# Patient Record
Sex: MALE | Race: WHITE | NOT HISPANIC OR LATINO | Employment: OTHER | URBAN - METROPOLITAN AREA
[De-identification: names, ages, dates, MRNs, and addresses within clinical notes are randomized per-mention and may not be internally consistent; named-entity substitution may affect disease eponyms.]

---

## 2018-01-19 ENCOUNTER — OFFICE VISIT (OUTPATIENT)
Dept: URGENT CARE | Facility: CLINIC | Age: 68
End: 2018-01-19
Payer: MEDICARE

## 2018-01-19 VITALS
SYSTOLIC BLOOD PRESSURE: 136 MMHG | RESPIRATION RATE: 14 BRPM | HEIGHT: 69 IN | OXYGEN SATURATION: 96 % | WEIGHT: 190 LBS | TEMPERATURE: 98.3 F | BODY MASS INDEX: 28.14 KG/M2 | HEART RATE: 81 BPM | DIASTOLIC BLOOD PRESSURE: 88 MMHG

## 2018-01-19 DIAGNOSIS — J32.9 RHINOSINUSITIS: ICD-10-CM

## 2018-01-19 DIAGNOSIS — J98.8 RTI (RESPIRATORY TRACT INFECTION): ICD-10-CM

## 2018-01-19 PROCEDURE — 99203 OFFICE O/P NEW LOW 30 MIN: CPT | Performed by: FAMILY MEDICINE

## 2018-01-19 RX ORDER — AMOXICILLIN AND CLAVULANATE POTASSIUM 875; 125 MG/1; MG/1
1 TABLET, FILM COATED ORAL 2 TIMES DAILY
Qty: 14 TAB | Refills: 0 | Status: SHIPPED | OUTPATIENT
Start: 2018-01-19 | End: 2018-01-26

## 2018-01-19 RX ORDER — PREDNISONE 10 MG/1
30 TABLET ORAL EVERY MORNING
Qty: 21 TAB | Refills: 0 | Status: SHIPPED | OUTPATIENT
Start: 2018-01-19 | End: 2018-01-26

## 2018-01-19 ASSESSMENT — PATIENT HEALTH QUESTIONNAIRE - PHQ9: CLINICAL INTERPRETATION OF PHQ2 SCORE: 0

## 2018-01-19 ASSESSMENT — ENCOUNTER SYMPTOMS
ORTHOPNEA: 0
FOCAL WEAKNESS: 0
CHILLS: 0
FEVER: 0
SINUS PAIN: 1
SHORTNESS OF BREATH: 0
HEMOPTYSIS: 0
DIZZINESS: 0

## 2018-01-19 NOTE — PROGRESS NOTES
"Subjective:      Marquez Crawford is a 67 y.o. male who presents with Sinus Problem (possible sinus infection)    Chief Complaint   Patient presents with   • Sinus Problem     possible sinus infection        - This is a very pleasant 67 y.o. male with complaints of sinus pain pressure DC x 3wks, no NVFC          ALLERGIES:  Patient has no known allergies.     PMH:  History reviewed. No pertinent past medical history.     MEDS:  No current outpatient prescriptions on file.    ** I have documented what I find to be significant in regards to past medical, social, family and surgical history  in my HPI or under PMH/PSH/FH review section, otherwise it is contributory **           HPI    Review of Systems   Constitutional: Negative for chills and fever.   HENT: Positive for congestion and sinus pain.    Respiratory: Negative for hemoptysis and shortness of breath.    Cardiovascular: Negative for chest pain and orthopnea.   Neurological: Negative for dizziness and focal weakness.          Objective:     /88   Pulse 81   Temp 36.8 °C (98.3 °F)   Resp 14   Ht 1.753 m (5' 9\")   Wt 86.2 kg (190 lb)   SpO2 96%   BMI 28.06 kg/m²      Physical Exam   Constitutional: He appears well-developed. No distress.   HENT:   Head: Normocephalic and atraumatic.   Mouth/Throat: Oropharynx is clear and moist.   Eyes: Conjunctivae are normal.   Neck: Neck supple.   Cardiovascular: Regular rhythm.    No murmur heard.  Pulmonary/Chest: Effort normal and breath sounds normal. No respiratory distress.   Neurological: He is alert. He exhibits normal muscle tone.   Skin: Skin is warm and dry.   Psychiatric: He has a normal mood and affect. Judgment normal.   Nursing note and vitals reviewed.  boggy nasal mucosa, perf septum.             Assessment/Plan:         1. RTI (respiratory tract infection)     2. Rhinosinusitis               Dx & d/c instructions discussed w/ patient and/or family members. Follow up w/ Prvt Dr or here in 3-4 " days if not getting better, sooner if needed,  ER if worse and UC/PCP unavailable.        Possible side effects (i.e. Rash, GI upset/constipation, sedation, elevation of BP or sugars) of any medications given discussed.

## 2018-03-05 ENCOUNTER — OFFICE VISIT (OUTPATIENT)
Dept: URGENT CARE | Facility: CLINIC | Age: 68
End: 2018-03-05
Payer: MEDICARE

## 2018-03-05 VITALS
DIASTOLIC BLOOD PRESSURE: 80 MMHG | BODY MASS INDEX: 27.85 KG/M2 | HEIGHT: 69 IN | TEMPERATURE: 98 F | SYSTOLIC BLOOD PRESSURE: 120 MMHG | HEART RATE: 68 BPM | WEIGHT: 188 LBS | RESPIRATION RATE: 18 BRPM | OXYGEN SATURATION: 98 %

## 2018-03-05 DIAGNOSIS — J01.41 ACUTE RECURRENT PANSINUSITIS: ICD-10-CM

## 2018-03-05 DIAGNOSIS — J22 LRTI (LOWER RESPIRATORY TRACT INFECTION): ICD-10-CM

## 2018-03-05 PROCEDURE — 99214 OFFICE O/P EST MOD 30 MIN: CPT | Performed by: PHYSICIAN ASSISTANT

## 2018-03-05 RX ORDER — DOXYCYCLINE HYCLATE 100 MG
100 TABLET ORAL 2 TIMES DAILY
Qty: 20 TAB | Refills: 0 | Status: SHIPPED | OUTPATIENT
Start: 2018-03-05 | End: 2018-03-15

## 2018-03-05 RX ORDER — METHYLPREDNISOLONE 4 MG/1
TABLET ORAL
Qty: 21 TAB | Refills: 0 | Status: SHIPPED | OUTPATIENT
Start: 2018-03-05 | End: 2018-03-20

## 2018-03-05 ASSESSMENT — ENCOUNTER SYMPTOMS
SPUTUM PRODUCTION: 0
CHILLS: 0
FOCAL WEAKNESS: 0
TINGLING: 0
SHORTNESS OF BREATH: 0
HEADACHES: 1
COUGH: 1
SINUS PAIN: 1
MYALGIAS: 0
ABDOMINAL PAIN: 0
DIARRHEA: 0
PALPITATIONS: 0
FEVER: 0
HOARSE VOICE: 0
SWOLLEN GLANDS: 0
VOMITING: 0
SORE THROAT: 0
SENSORY CHANGE: 0
WHEEZING: 0
NAUSEA: 0
SINUS PRESSURE: 1

## 2018-03-06 NOTE — PROGRESS NOTES
"Subjective:      Marquez Crawford is a 67 y.o. male who presents with Sinus Problem            Sinus Problem   This is a new problem. Episode onset: 2 weeks  The problem is unchanged. There has been no fever. Associated symptoms include congestion, coughing, ear pain (ear pressure ), headaches and sinus pressure. Pertinent negatives include no chills, hoarse voice, shortness of breath, sneezing, sore throat or swollen glands. Treatments tried: Mucinex  The treatment provided no relief.     No past medical history on file.    No past surgical history on file.    No family history on file.    No Known Allergies    Medications, Allergies, and current problem list reviewed today in Epic      Review of Systems   Constitutional: Negative for chills, fever and malaise/fatigue.   HENT: Positive for congestion, ear pain (ear pressure ), sinus pain and sinus pressure. Negative for ear discharge, hoarse voice, sneezing and sore throat.    Respiratory: Positive for cough. Negative for sputum production, shortness of breath and wheezing.    Cardiovascular: Negative for chest pain, palpitations and leg swelling.   Gastrointestinal: Negative for abdominal pain, diarrhea, nausea and vomiting.   Musculoskeletal: Negative for myalgias.   Skin: Negative for rash.   Neurological: Positive for headaches. Negative for tingling, sensory change and focal weakness.     All other systems reviewed and are negative.        Objective:     /80   Pulse 68   Temp 36.7 °C (98 °F)   Resp 18   Ht 1.753 m (5' 9\")   Wt 85.3 kg (188 lb)   SpO2 98%   BMI 27.76 kg/m²      Physical Exam   Constitutional: He is oriented to person, place, and time. He appears well-developed and well-nourished. No distress.   HENT:   Head: Normocephalic and atraumatic.   Right Ear: Tympanic membrane, external ear and ear canal normal.   Left Ear: Tympanic membrane, external ear and ear canal normal.   Nose: Mucosal edema and rhinorrhea present. Right sinus " exhibits maxillary sinus tenderness. Left sinus exhibits maxillary sinus tenderness.   Mouth/Throat: Uvula is midline, oropharynx is clear and moist and mucous membranes are normal.   Eyes: Conjunctivae are normal.   Neck: Neck supple.   Cardiovascular: Normal rate, regular rhythm and normal heart sounds.  Exam reveals no gallop and no friction rub.    No murmur heard.  Pulmonary/Chest: Effort normal. No respiratory distress. He has no decreased breath sounds. He has wheezes (mild, diffuse wheezes). He has no rhonchi. He has no rales.   Lymphadenopathy:     He has no cervical adenopathy.   Neurological: He is alert and oriented to person, place, and time. No cranial nerve deficit.   Skin: Skin is warm and dry. No rash noted.   Psychiatric: He has a normal mood and affect. His behavior is normal. Judgment and thought content normal.               Assessment/Plan:     1. Acute recurrent pansinusitis    2. LRTI (lower respiratory tract infection)    - doxycycline (VIBRAMYCIN) 100 MG Tab; Take 1 Tab by mouth 2 times a day for 10 days.  Dispense: 20 Tab; Refill: 0  - MethylPREDNISolone (MEDROL DOSEPAK) 4 MG Tablet Therapy Pack; Take as directed on package. 1 pack.  Dispense: 21 Tab; Refill: 0    - encouraged Flonase, nasal saline rinses.     Differential diagnoses, Supportive care, and indications for immediate follow-up discussed with patient.   Instructed to return to clinic or nearest emergency department for any change in condition, further concerns, or worsening of symptoms.    The patient demonstrated a good understanding and agreed with the treatment plan.    Altagracia Monroy P.A.-C.

## 2018-03-20 ENCOUNTER — OFFICE VISIT (OUTPATIENT)
Dept: MEDICAL GROUP | Facility: MEDICAL CENTER | Age: 68
End: 2018-03-20
Payer: MEDICARE

## 2018-03-20 VITALS
TEMPERATURE: 98.2 F | BODY MASS INDEX: 27.98 KG/M2 | WEIGHT: 188.93 LBS | HEIGHT: 69 IN | DIASTOLIC BLOOD PRESSURE: 68 MMHG | RESPIRATION RATE: 16 BRPM | OXYGEN SATURATION: 96 % | HEART RATE: 61 BPM | SYSTOLIC BLOOD PRESSURE: 138 MMHG

## 2018-03-20 DIAGNOSIS — K21.9 GASTROESOPHAGEAL REFLUX DISEASE, ESOPHAGITIS PRESENCE NOT SPECIFIED: ICD-10-CM

## 2018-03-20 DIAGNOSIS — E78.5 HYPERLIPIDEMIA LDL GOAL <100: ICD-10-CM

## 2018-03-20 DIAGNOSIS — J32.9 RECURRENT SINUSITIS: ICD-10-CM

## 2018-03-20 DIAGNOSIS — Z00.00 PREVENTATIVE HEALTH CARE: ICD-10-CM

## 2018-03-20 PROCEDURE — 99203 OFFICE O/P NEW LOW 30 MIN: CPT | Performed by: PHYSICIAN ASSISTANT

## 2018-03-20 RX ORDER — PRAVASTATIN SODIUM 10 MG
10 TABLET ORAL DAILY
Qty: 30 TAB | Refills: 3 | Status: ON HOLD | OUTPATIENT
Start: 2018-03-20 | End: 2020-02-23

## 2018-03-20 NOTE — LETTER
[facility]  David Balbuena P.A.-C.  [address]  [place] [address]  Fax: [phone]   Authorization for Release/Disclosure of   Protected Health Information   Name: MARQUEZ HARDWICK : 1950 SSN: [id number]   Address: [address] [place] [address]  [place] [address] Phone:    [phone] (home)    I authorize the entity listed below to release/disclose the PHI below to:   [facility]/David Balbuena P.A.-C. and David Blabuena P.A.-C.   Provider or Entity Name:  [facility]   Address   City, State, Zip   Phone:      Fax:     Reason for request: continuity of care   Information to be released:    [  X] LAST COLONOSCOPY,  including any PATH REPORT and follow-up  [  ] LAST FIT/COLOGUARD RESULT [  ] LAST DEXA  [  ] LAST MAMMOGRAM  [  ] LAST PAP  [  ] LAST LABS [  ] RETINA EXAM REPORT  [  ] IMMUNIZATION RECORDS  [  ] Release all info      [  ] Check here and initial the line next to each item to release ALL health information INCLUDING  _____ Care and treatment for drug and / or alcohol abuse  _____ HIV testing, infection status, or AIDS  _____ Genetic Testing    DATES OF SERVICE OR TIME PERIOD TO BE DISCLOSED: _____________  I understand and acknowledge that:  * This Authorization may be revoked at any time by you in writing, except if your health information has already been used or disclosed.  * Your health information that will be used or disclosed as a result of you signing this authorization could be re-disclosed by the recipient. If this occurs, your re-disclosed health information may no longer be protected by State or Federal laws.  * You may refuse to sign this Authorization. Your refusal will not affect your ability to obtain treatment.  * This Authorization becomes effective upon signing and will  on (date) __________.      If no date is indicated, this Authorization will  one (1) year from the signature date.    Name: Marquez Hardwick    Signature:   Date:      3/20/2018       PLEASE FAX REQUESTED RECORDS BACK TO: [phone]

## 2018-03-21 NOTE — ASSESSMENT & PLAN NOTE
Has chronic reflux for the past 15 years. Takes Prevacid as needed. Indication is effective. Has not had an upper endoscopy.

## 2018-03-21 NOTE — ASSESSMENT & PLAN NOTE
Last year when labs were performed to [facility] LDL was greater than 180. Total cholesterol at 264. HDL levels were elevated. In the past he was on Lipitor but the medication caused side effects. He hasn't had a cardiac workup in quite some time.

## 2018-03-21 NOTE — PROGRESS NOTES
"Subjective:   Marquez Crawford is a 67 y.o. male here today for establishing care and for sinusitis for 2 months.    Recurrent sinusitis  This is a 67-year-old healthy male who is here today to establish care. Since January he has been fighting sinus congestion and drainage. His twice given rounds of antibiotics. Still has some sinus congestion. Denies any fevers. Uses a nasal spray intermittently.    Hyperlipidemia LDL goal <100  Last year when labs were performed to [facility] LDL was greater than 180. Total cholesterol at 264. HDL levels were elevated. In the past he was on Lipitor but the medication caused side effects. He hasn't had a cardiac workup in quite some time.    GERD (gastroesophageal reflux disease)  Has chronic reflux for the past 15 years. Takes Prevacid as needed. Indication is effective. Has not had an upper endoscopy.       Current medicines (including changes today)  Current Outpatient Prescriptions   Medication Sig Dispense Refill   • Lansoprazole (PREVACID PO) Take  by mouth.     • pravastatin (PRAVACHOL) 10 MG Tab Take 1 Tab by mouth every day. 30 Tab 3     No current facility-administered medications for this visit.      He  has no past medical history on file.    Social History and Family History were reviewed and updated.    ROS   No chest pain, no shortness of breath, no abdominal pain and all other systems were reviewed and are negative.       Objective:     Blood pressure 138/68, pulse 61, temperature 36.8 °C (98.2 °F), resp. rate 16, height 1.753 m (5' 9\"), weight 85.7 kg (188 lb 15 oz), SpO2 96 %. Body mass index is 27.9 kg/m².   Physical Exam:  Constitutional: Alert, no distress.  Skin: Warm, dry, good turgor, no rashes in visible areas.  Eye: Equal, round and reactive, conjunctiva clear, lids normal.  ENMT: Lips without lesions, good dentition, oropharynx clear. TMs bilaterally are clear. No sinus tenderness to palpation.  Neck: Trachea midline, no masses.   Lymph: No " cervical or supraclavicular lymphadenopathy  Respiratory: Unlabored respiratory effort, lungs clear to auscultation, no wheezes, no ronchi.  Cardiovascular: Normal S1, S2, no murmur, no edema.  Psych: Alert and oriented x3, normal affect and mood.        Assessment and Plan:   The following treatment plan was discussed    1. Recurrent sinusitis  Acute, new onset condition. Discussed with a resolving viral process. Not a bacterial concern. Take an antihistamine. Push fluids. Continue nasal spray. Contact me with any concerns such as fevers.    2. Gastroesophageal reflux disease, esophagitis presence not specified  Chronic condition. Stable. May continue PPI daily. May refer to GI in the future for upper endoscopy.    3. Hyperlipidemia LDL goal <100  Chronic condition. We'll try pravastatin 10 mg daily. Repeat lipid panel and hepatic function to be performed in 6 weeks.  - Referred to cardiology to establish care. Ravastatin (PRAVACHOL) 10 MG Tab; Take 1 Tab by mouth every day.  Dispense: 30 Tab; Refill: 3  - REFERRAL TO CARDIOLOGY  - LIPID PROFILE; Future  - HEPATIC FUNCTION PANEL; Future    4. Preventative health care  Referred to cardiology. Also reviewed labs from [facility]. As well as will obtain previous medical records [facility].  - REFERRAL TO CARDIOLOGY      Followup: Return if symptoms worsen or fail to improve.    Please note that this dictation was created using voice recognition software. I have made every reasonable attempt to correct obvious errors, but I expect that there are errors of grammar and possibly content that I did not discover before finalizing the note.

## 2018-03-21 NOTE — ASSESSMENT & PLAN NOTE
This is a 67-year-old healthy male who is here today to establish care. Since January he has been fighting sinus congestion and drainage. His twice given rounds of antibiotics. Still has some sinus congestion. Denies any fevers. Uses a nasal spray intermittently.

## 2018-03-27 DIAGNOSIS — Z12.11 SCREENING FOR COLORECTAL CANCER: ICD-10-CM

## 2018-03-27 DIAGNOSIS — Z12.12 SCREENING FOR COLORECTAL CANCER: ICD-10-CM

## 2018-04-30 ENCOUNTER — PATIENT OUTREACH (OUTPATIENT)
Dept: HEALTH INFORMATION MANAGEMENT | Facility: OTHER | Age: 68
End: 2018-04-30

## 2018-04-30 NOTE — PROGRESS NOTES
Attempt #:1    WebIZ Checked & Epic Updated: yes  HealthConnect Verified: yes  Verify PCP: yes    Communication Preference Obtained: yes       Annual Wellness Visit Scheduling  1. Scheduling Status:Not Scheduled. Patient states they are not interested        Care Gap Scheduling (Attempt to Schedule EACH Overdue Care Gap!)  Health Maintenance Due   Topic Date Due   • Annual Wellness Visit  1950   • IMM DTaP/Tdap/Td Vaccine (1 - Tdap) 10/17/1968   • COLONOSCOPY  10/17/2013   • IMM PNEUMOCOCCAL 65+ (ADULT) LOW/MEDIUM RISK SERIES (1 of 2 - PCV13) 10/17/2014     PT DECLINED AWV-WANTS TO DISCUSS WITH PCP-INCLUDING COLONOSCOPY  MyChart Activation:  NA  MyChart Elsie: no  Virtual Visits: no  Opt In to Text Messages: no

## 2019-05-16 ENCOUNTER — PATIENT OUTREACH (OUTPATIENT)
Dept: HEALTH INFORMATION MANAGEMENT | Facility: OTHER | Age: 69
End: 2019-05-16

## 2019-05-16 NOTE — PROGRESS NOTES
Declined Care Management - Attempt #1  Spoke with wife she stated she will back since she schedules his appointments. She stated her husbands primary is Dr. Álvarez but patient has never established with Lena Álvarez.     Please transfer to Patient Outreach Team at [phone] when patient returns call.    WebIZ Checked & Epic Updated:  No record found    HealthConnect Verified: yes    Attempt # 1

## 2019-09-04 ENCOUNTER — TELEPHONE (OUTPATIENT)
Dept: MEDICAL GROUP | Facility: MEDICAL CENTER | Age: 69
End: 2019-09-04

## 2019-09-04 NOTE — TELEPHONE ENCOUNTER
Colorectal Care Gap Outreach    1. Confirmed patient is between the ages of 50-75: YES     2. Confirmed that patient IS overdue or due soon for colorectal cancer screening: YES     3. Were orders placed within the last 12 months to complete screening: NO     Phone Number Called: [phone] (home)     Call outcome: Pt wants to schedule with new provider first before a referral is requested.     _____________________________________________________________________    Colon Cancer Screening Guidelines:     Important: If patient has any history of colon polyps or family history of colorectal cancer, FIT and Cologuard are NOT appropriate options. A colonoscopy is the recommended test for this set of patients.    • Colonoscopy  o Always recommend colonoscopy first.   o A colonoscopy is recommended over the other tests because it provides direct visualization of the colon and if there are small polyps these can also be removed with one procedure.  o If negative and no family history, could be cleared for 10 years.     • Cologuard/FIT  o Cologuard is completed once every 3 years.  o FIT is completed annually.  o If positive, Cologuard and FIT will require a diagnostic colonoscopy. Screening colonoscopies are classically covered by insurances, however, diagnostic colonoscopies may result in a bill.

## 2019-11-10 ENCOUNTER — PATIENT OUTREACH (OUTPATIENT)
Dept: HEALTH INFORMATION MANAGEMENT | Facility: OTHER | Age: 69
End: 2019-11-10

## 2019-11-10 NOTE — PROGRESS NOTES
Outcome: pt did not want to schedule at the moment stated he would look into the [facility] locations ( do his own research) and call us back    Please transfer to [facility]  [phone] when patient returns call.     HealthConnect Verified: yes     Attempt # 1

## 2019-12-07 ENCOUNTER — HOSPITAL ENCOUNTER (OUTPATIENT)
Facility: MEDICAL CENTER | Age: 69
End: 2019-12-07
Payer: MEDICARE

## 2019-12-07 PROCEDURE — 82274 ASSAY TEST FOR BLOOD FECAL: CPT

## 2019-12-13 LAB — HEMOCCULT STL QL IA: NEGATIVE

## 2019-12-16 ENCOUNTER — TELEPHONE (OUTPATIENT)
Dept: MEDICAL GROUP | Facility: MEDICAL CENTER | Age: 69
End: 2019-12-16

## 2019-12-17 NOTE — TELEPHONE ENCOUNTER
Phone Number Called: [phone] (home)       Call outcome: spoke to patient regarding message below    Message: Would like to see him in the office to check his health.  It's been over a year.  FIT test was negative.     Patient stated he would.    Edgardo Loving, Med Ass't

## 2019-12-19 ENCOUNTER — OFFICE VISIT (OUTPATIENT)
Dept: URGENT CARE | Facility: CLINIC | Age: 69
End: 2019-12-19
Payer: MEDICARE

## 2019-12-19 VITALS
DIASTOLIC BLOOD PRESSURE: 88 MMHG | WEIGHT: 190 LBS | TEMPERATURE: 97.7 F | HEIGHT: 69 IN | BODY MASS INDEX: 28.14 KG/M2 | SYSTOLIC BLOOD PRESSURE: 138 MMHG | RESPIRATION RATE: 16 BRPM | HEART RATE: 59 BPM | OXYGEN SATURATION: 97 %

## 2019-12-19 DIAGNOSIS — L25.9 CONTACT DERMATITIS, UNSPECIFIED CONTACT DERMATITIS TYPE, UNSPECIFIED TRIGGER: ICD-10-CM

## 2019-12-19 DIAGNOSIS — J01.90 ACUTE NON-RECURRENT SINUSITIS, UNSPECIFIED LOCATION: ICD-10-CM

## 2019-12-19 PROCEDURE — 99214 OFFICE O/P EST MOD 30 MIN: CPT | Performed by: NURSE PRACTITIONER

## 2019-12-19 RX ORDER — TRIAMCINOLONE ACETONIDE 1 MG/G
1 OINTMENT TOPICAL 2 TIMES DAILY
Qty: 1 TUBE | Refills: 0 | Status: SHIPPED | OUTPATIENT
Start: 2019-12-19 | End: 2020-01-02

## 2019-12-19 RX ORDER — AMOXICILLIN AND CLAVULANATE POTASSIUM 875; 125 MG/1; MG/1
1 TABLET, FILM COATED ORAL 2 TIMES DAILY
Qty: 10 TAB | Refills: 0 | Status: SHIPPED | OUTPATIENT
Start: 2019-12-19 | End: 2019-12-24

## 2019-12-19 RX ORDER — FLUTICASONE PROPIONATE 50 MCG
2 SPRAY, SUSPENSION (ML) NASAL DAILY
Qty: 1 BOTTLE | Refills: 0 | Status: ON HOLD | OUTPATIENT
Start: 2019-12-19 | End: 2020-02-23

## 2019-12-19 RX ORDER — ECHINACEA PURPUREA EXTRACT 125 MG
2 TABLET ORAL
Qty: 1 BOTTLE | Refills: 0 | Status: ON HOLD | OUTPATIENT
Start: 2019-12-19 | End: 2020-02-23

## 2019-12-19 ASSESSMENT — ENCOUNTER SYMPTOMS
SHORTNESS OF BREATH: 0
HEADACHES: 1

## 2019-12-19 NOTE — PROGRESS NOTES
Subjective:     Marquez Crawford is a 69 y.o. male who presents for Pharyngitis (sinus pain, headache started 3 days ago) and Other (skin problem, rashes on the lower back having now for a while now)      Fatigue Friday. Sore throat, green nasal mucus. Clear cough, Post nasal drip. Sinus pressure, burning sensation. No hx sinus infection. Wife insisted he come in. Needs PCP.     Rash started after moving to a new place in [place] in June. Cortisone and oatmeal. Occasional itchy patches. On back currently. Psoriasis. No hx liver or renal issues.         Pharyngitis    This is a new problem. The current episode started in the past 7 days. The problem has been gradually worsening. Neither side of throat is experiencing more pain than the other. There has been no fever. The pain is at a severity of 0/10. The patient is experiencing no pain. Associated symptoms include congestion, coughing and headaches. Pertinent negatives include no ear discharge, shortness of breath or stridor.   Other   Associated symptoms include congestion, coughing, headaches, a rash and a sore throat. Pertinent negatives include no weakness.       History reviewed. No pertinent past medical history.    History reviewed. No pertinent surgical history.    Social History     Socioeconomic History   • Marital status:      Spouse name: Not on file   • Number of children: Not on file   • Years of education: Not on file   • Highest education level: Not on file   Occupational History   • Not on file   Social Needs   • Financial resource strain: Not on file   • Food insecurity:     Worry: Not on file     Inability: Not on file   • Transportation needs:     Medical: Not on file     Non-medical: Not on file   Tobacco Use   • Smoking status: Never Smoker   • Smokeless tobacco: Never Used   Substance and Sexual Activity   • Alcohol use: Yes   • Drug use: Yes     Types: Marijuana   • Sexual activity: Yes     Partners: Female   Lifestyle   •  "Physical activity:     Days per week: Not on file     Minutes per session: Not on file   • Stress: Not on file   Relationships   • Social connections:     Talks on phone: Not on file     Gets together: Not on file     Attends Church service: Not on file     Active member of club or organization: Not on file     Attends meetings of clubs or organizations: Not on file     Relationship status: Not on file   • Intimate partner violence:     Fear of current or ex partner: Not on file     Emotionally abused: Not on file     Physically abused: Not on file     Forced sexual activity: Not on file   Other Topics Concern   • Not on file   Social History Narrative   • Not on file        History reviewed. No pertinent family history.     Allergies   Allergen Reactions   • Lipitor [Atorvastatin]    • Statins [Hmg-Coa-R Inhibitors]      Blister and the severe joint problem       Review of Systems   Constitutional: Positive for malaise/fatigue.   HENT: Positive for congestion, sinus pain and sore throat. Negative for ear discharge.    Respiratory: Positive for cough. Negative for shortness of breath, wheezing and stridor.    Skin: Positive for itching and rash.   Neurological: Positive for headaches. Negative for tremors, sensory change, speech change, focal weakness and weakness.   All other systems reviewed and are negative.       Objective:   /88   Pulse (!) 59   Temp 36.5 °C (97.7 °F)   Resp 16   Ht 1.753 m (5' 9\")   Wt 86.2 kg (190 lb)   SpO2 97%   BMI 28.06 kg/m²     Physical Exam  Vitals signs reviewed.   Constitutional:       General: He is not in acute distress.     Appearance: He is well-developed.   HENT:      Head: Normocephalic and atraumatic.      Right Ear: Ear canal and external ear normal. No drainage, swelling or tenderness. A middle ear effusion is present. No mastoid tenderness. No hemotympanum. Tympanic membrane is not injected, perforated, erythematous or bulging.      Left Ear: Ear canal and " external ear normal. No drainage, swelling or tenderness. A middle ear effusion is present. No mastoid tenderness. No hemotympanum. Tympanic membrane is not injected, perforated, erythematous or bulging.      Ears:      Comments: Slight bilateral effusion.      Nose: Mucosal edema present.      Right Sinus: Maxillary sinus tenderness present. No frontal sinus tenderness.      Left Sinus: Maxillary sinus tenderness present. No frontal sinus tenderness.      Mouth/Throat:      Mouth: Mucous membranes are moist.      Pharynx: Uvula midline. Posterior oropharyngeal erythema present. No uvula swelling.      Comments: Post nasal drip  Eyes:      Conjunctiva/sclera: Conjunctivae normal.      Pupils: Pupils are equal, round, and reactive to light.   Neck:      Musculoskeletal: Normal range of motion and neck supple.   Cardiovascular:      Rate and Rhythm: Normal rate.   Pulmonary:      Effort: Pulmonary effort is normal.      Breath sounds: Normal breath sounds.   Musculoskeletal: Normal range of motion.   Lymphadenopathy:      Head:      Right side of head: Tonsillar adenopathy present. No submental, submandibular, preauricular, posterior auricular or occipital adenopathy.      Left side of head: Tonsillar adenopathy present. No submental, submandibular, preauricular, posterior auricular or occipital adenopathy.   Skin:     General: Skin is warm and dry.      Findings: Rash present. Rash is papular.      Comments: Red circular lesions.    Neurological:      General: No focal deficit present.      Mental Status: He is alert and oriented to person, place, and time.      GCS: GCS eye subscore is 4. GCS verbal subscore is 5. GCS motor subscore is 6.   Psychiatric:         Mood and Affect: Mood normal.         Speech: Speech normal.         Behavior: Behavior normal.         Thought Content: Thought content normal.         Judgment: Judgment normal.         Assessment/Plan:   1. Acute non-recurrent sinusitis, unspecified  location  - amoxicillin-clavulanate (AUGMENTIN) 875-125 MG Tab; Take 1 Tab by mouth 2 times a day for 5 days.  Dispense: 10 Tab; Refill: 0  - sodium chloride (OCEAN NASAL SPRAY) 0.65 % Solution; Spray 2 Sprays in nose every 2 hours as needed for Congestion.  Dispense: 1 Bottle; Refill: 0  - fluticasone (FLONASE) 50 MCG/ACT nasal spray; Spray 2 Sprays in nose every day.  Dispense: 1 Bottle; Refill: 0    2. Contact dermatitis, unspecified contact dermatitis type, unspecified trigger  - REFERRAL TO FOLLOW-UP WITH PRIMARY CARE  - triamcinolone acetonide (KENALOG) 0.1 % Ointment; Apply 1 Application to affected area(s) 2 times a day for 14 days.  Dispense: 1 Tube; Refill: 0  -Nasal spray and allergy medications as directed (Zyrtec or Loratadine).  -You may try saline irrigation or neti pot.   -Drink plenty of fluids.  -Ibuprofen or Tylenol as directed for pain.   -Warm compress to sinuses.      Follow up with primary care provider. Urgently for worsening symptoms, persistent fevers, facial swelling, visual changes, weakness, elevated heart rate, stiff neck, symptoms last longer than 10 days, or any other concerns.    Differential diagnosis, natural history, supportive care, and indications for immediate follow-up discussed.

## 2019-12-19 NOTE — PATIENT INSTRUCTIONS
"Upper Respiratory Infection, Adult  Most upper respiratory infections (URIs) are caused by a virus. A URI affects the nose, throat, and upper air passages. The most common type of URI is often called \"the common cold.\"  Follow these instructions at home:  · Take medicines only as told by your doctor.  · Gargle warm saltwater or take cough drops to comfort your throat as told by your doctor.  · Use a warm mist humidifier or inhale steam from a shower to increase air moisture. This may make it easier to breathe.  · Drink enough fluid to keep your pee (urine) clear or pale yellow.  · Eat soups and other clear broths.  · Have a healthy diet.  · Rest as needed.  · Go back to work when your fever is gone or your doctor says it is okay.  ¨ You may need to stay home longer to avoid giving your URI to others.  ¨ You can also wear a face mask and wash your hands often to prevent spread of the virus.  · Use your inhaler more if you have asthma.  · Do not use any tobacco products, including cigarettes, chewing tobacco, or electronic cigarettes. If you need help quitting, ask your doctor.  Contact a doctor if:  · You are getting worse, not better.  · Your symptoms are not helped by medicine.  · You have chills.  · You are getting more short of breath.  · You have brown or red mucus.  · You have yellow or brown discharge from your nose.  · You have pain in your face, especially when you bend forward.  · You have a fever.  · You have puffy (swollen) neck glands.  · You have pain while swallowing.  · You have white areas in the back of your throat.  Get help right away if:  · You have very bad or constant:  ¨ Headache.  ¨ Ear pain.  ¨ Pain in your forehead, behind your eyes, and over your cheekbones (sinus pain).  ¨ Chest pain.  · You have long-lasting (chronic) lung disease and any of the following:  ¨ Wheezing.  ¨ Long-lasting cough.  ¨ Coughing up blood.  ¨ A change in your usual mucus.  · You have a stiff neck.  · You have " changes in your:  ¨ Vision.  ¨ Hearing.  ¨ Thinking.  ¨ Mood.  This information is not intended to replace advice given to you by your health care provider. Make sure you discuss any questions you have with your health care provider.  Document Released: 06/05/2009 Document Revised: 08/20/2017 Document Reviewed: 03/25/2015  [employer] Interactive Patient Education © 2017 [employer]      Sinusitis, Adult  Sinusitis is soreness and inflammation of your sinuses. Sinuses are hollow spaces in the bones around your face. Your sinuses are located:  Around your eyes.  In the middle of your forehead.  Behind your nose.  In your cheekbones.  Your sinuses and nasal passages are lined with a stringy fluid (mucus). Mucus normally drains out of your sinuses. When your nasal tissues become inflamed or swollen, the mucus can become trapped or blocked so air cannot flow through your sinuses. This allows bacteria, viruses, and funguses to grow, which leads to infection.  Sinusitis can develop quickly and last for 7?10 days (acute) or for more than 12 weeks (chronic). Sinusitis often develops after a cold.  What are the causes?  This condition is caused by anything that creates swelling in the sinuses or stops mucus from draining, including:  Allergies.  Asthma.  Bacterial or viral infection.  Abnormally shaped bones between the nasal passages.  Nasal growths that contain mucus (nasal polyps).  Narrow sinus openings.  Pollutants, such as chemicals or irritants in the air.  A foreign object stuck in the nose.  A fungal infection. This is rare.  What increases the risk?  The following factors may make you more likely to develop this condition:  Having allergies or asthma.  Having had a recent cold or respiratory tract infection.  Having structural deformities or blockages in your nose or sinuses.  Having a weak immune system.  Doing a lot of swimming or diving.  Overusing nasal sprays.  Smoking.  What are the signs or symptoms?  The  main symptoms of this condition are pain and a feeling of pressure around the affected sinuses. Other symptoms include:  Upper toothache.  Earache.  Headache.  Bad breath.  Decreased sense of smell and taste.  A cough that may get worse at night.  Fatigue.  Fever.  Thick drainage from your nose. The drainage is often green and it may contain pus (purulent).  Stuffy nose or congestion.  Postnasal drip. This is when extra mucus collects in the throat or back of the nose.  Swelling and warmth over the affected sinuses.  Sore throat.  Sensitivity to light.  How is this diagnosed?  This condition is diagnosed based on symptoms, a medical history, and a physical exam. To find out if your condition is acute or chronic, your health care provider may:  Look in your nose for signs of nasal polyps.  Tap over the affected sinus to check for signs of infection.  View the inside of your sinuses using an imaging device that has a light attached (endoscope).  If your health care provider suspects that you have chronic sinusitis, you may also:  Be tested for allergies.  Have a sample of mucus taken from your nose (nasal culture) and checked for bacteria.  Have a mucus sample examined to see if your sinusitis is related to an allergy.  If your sinusitis does not respond to treatment and it lasts longer than 8 weeks, you may have an MRI or CT scan to check your sinuses. These scans also help to determine how severe your infection is.  In rare cases, a bone biopsy may be done to rule out more serious types of fungal sinus disease.  How is this treated?  Treatment for sinusitis depends on the cause and whether your condition is chronic or acute. If a virus is causing your sinusitis, your symptoms will go away on their own within 10 days. You may be given medicines to relieve your symptoms, including:  Topical nasal decongestants. They shrink swollen nasal passages and let mucus drain from your sinuses.  Antihistamines. These drugs block  inflammation that is triggered by allergies. This can help to ease swelling in your nose and sinuses.  Topical nasal corticosteroids. These are nasal sprays that ease inflammation and swelling in your nose and sinuses.  Nasal saline washes. These rinses can help to get rid of thick mucus in your nose.  If your condition is caused by bacteria, you will be given an antibiotic medicine. If your condition is caused by a fungus, you will be given an antifungal medicine.  Surgery may be needed to correct underlying conditions, such as narrow nasal passages. Surgery may also be needed to remove polyps.  Follow these instructions at home:  Medicines  Take, use, or apply over-the-counter and prescription medicines only as told by your health care provider. These may include nasal sprays.  If you were prescribed an antibiotic medicine, take it as told by your health care provider. Do not stop taking the antibiotic even if you start to feel better.  Hydrate and Humidify  Drink enough water to keep your urine clear or pale yellow. Staying hydrated will help to thin your mucus.  Use a cool mist humidifier to keep the humidity level in your home above 50%.  Inhale steam for 10-15 minutes, 3-4 times a day or as told by your health care provider. You can do this in the bathroom while a hot shower is running.  Limit your exposure to cool or dry air.  Rest  Rest as much as possible.  Sleep with your head raised (elevated).  Make sure to get enough sleep each night.  General instructions  Apply a warm, moist washcloth to your face 3-4 times a day or as told by your health care provider. This will help with discomfort.  Wash your hands often with soap and water to reduce your exposure to viruses and other germs. If soap and water are not available, use hand .  Do not smoke. Avoid being around people who are smoking (secondhand smoke).  Keep all follow-up visits as told by your health care provider. This is important.  Contact  a health care provider if:  You have a fever.  Your symptoms get worse.  Your symptoms do not improve within 10 days.  Get help right away if:  You have a severe headache.  You have persistent vomiting.  You have pain or swelling around your face or eyes.  You have vision problems.  You develop confusion.  Your neck is stiff.  You have trouble breathing.  This information is not intended to replace advice given to you by your health care provider. Make sure you discuss any questions you have with your health care provider.  Document Released: 12/18/2006 Document Revised: 08/13/2017 Document Reviewed: 10/12/2016  [employer] Interactive Patient Education © 2017 [employer]    Contact Dermatitis  Introduction  Dermatitis is redness, soreness, and swelling (inflammation) of the skin. Contact dermatitis is a reaction to certain substances that touch the skin. There are two types of contact dermatitis:  · Irritant contact dermatitis. This type is caused by something that irritates your skin, such as dry hands from washing them too much. This type does not require previous exposure to the substance for a reaction to occur. This type is more common.  · Allergic contact dermatitis. This type is caused by a substance that you are allergic to, such as a nickel allergy or poison ivy. This type only occurs if you have been exposed to the substance (allergen) before. Upon a repeat exposure, your body reacts to the substance. This type is less common.  What are the causes?  Many different substances can cause contact dermatitis. Irritant contact dermatitis is most commonly caused by exposure to:  · Makeup.  · Soaps.  · Detergents.  · Bleaches.  · Acids.  · Metal salts, such as nickel.  Allergic contact dermatitis is most commonly caused by exposure to:  · Poisonous plants.  · Chemicals.  · Jewelry.  · Latex.  · Medicines.  · Preservatives in products, such as clothing.  What increases the risk?  This condition is more likely to  develop in:  · People who have jobs that expose them to irritants or allergens.  · People who have certain medical conditions, such as asthma or eczema.  What are the signs or symptoms?  Symptoms of this condition may occur anywhere on your body where the irritant has touched you or is touched by you. Symptoms include:  · Dryness or flaking.  · Redness.  · Cracks.  · Itching.  · Pain or a burning feeling.  · Blisters.  · Drainage of small amounts of blood or clear fluid from skin cracks.  With allergic contact dermatitis, there may also be swelling in areas such as the eyelids, mouth, or genitals.  How is this diagnosed?  This condition is diagnosed with a medical history and physical exam. A patch skin test may be performed to help determine the cause. If the condition is related to your job, you may need to see an occupational medicine specialist.  How is this treated?  Treatment for this condition includes figuring out what caused the reaction and protecting your skin from further contact. Treatment may also include:  · Steroid creams or ointments. Oral steroid medicines may be needed in more severe cases.  · Antibiotics or antibacterial ointments, if a skin infection is present.  · Antihistamine lotion or an antihistamine taken by mouth to ease itching.  · A bandage (dressing).  Follow these instructions at home:  Skin Care  · Moisturize your skin as needed.  · Apply cool compresses to the affected areas.  · Try taking a bath with:  ¨ Epsom salts. Follow the instructions on the packaging. You can get these at your local pharmacy or grocery store.  ¨ Baking soda. Pour a small amount into the bath as directed by your health care provider.  ¨ Colloidal oatmeal. Follow the instructions on the packaging. You can get this at your local pharmacy or grocery store.  · Try applying baking soda paste to your skin. Stir water into baking soda until it reaches a paste-like consistency.  · Do not scratch your skin.  · Bathe  less frequently, such as every other day.  · Bathe in lukewarm water. Avoid using hot water.  Medicines  · Take or apply over-the-counter and prescription medicines only as told by your health care provider.  · If you were prescribed an antibiotic medicine, take or apply your antibiotic as told by your health care provider. Do not stop using the antibiotic even if your condition starts to improve.  General instructions  · Keep all follow-up visits as told by your health care provider. This is important.  · Avoid the substance that caused your reaction. If you do not know what caused it, keep a journal to try to track what caused it. Write down:  ¨ What you eat.  ¨ What cosmetic products you use.  ¨ What you drink.  ¨ What you wear in the affected area. This includes jewelry.  · If you were given a dressing, take care of it as told by your health care provider. This includes when to change and remove it.  Contact a health care provider if:  · Your condition does not improve with treatment.  · Your condition gets worse.  · You have signs of infection such as swelling, tenderness, redness, soreness, or warmth in the affected area.  · You have a fever.  · You have new symptoms.  Get help right away if:  · You have a severe headache, neck pain, or neck stiffness.  · You vomit.  · You feel very sleepy.  · You notice red streaks coming from the affected area.  · Your bone or joint underneath the affected area becomes painful after the skin has healed.  · The affected area turns darker.  · You have difficulty breathing.  This information is not intended to replace advice given to you by your health care provider. Make sure you discuss any questions you have with your health care provider.  Document Released: 12/15/2001 Document Revised: 05/25/2017 Document Reviewed: 05/04/2016  © 2017 [employer]

## 2019-12-23 ASSESSMENT — ENCOUNTER SYMPTOMS
TREMORS: 0
WEAKNESS: 0
FOCAL WEAKNESS: 0
COUGH: 1
STRIDOR: 0
WHEEZING: 0
SORE THROAT: 1
SENSORY CHANGE: 0
SINUS PAIN: 1
SPEECH CHANGE: 0

## 2020-02-22 ENCOUNTER — HOSPITAL ENCOUNTER (INPATIENT)
Facility: MEDICAL CENTER | Age: 70
LOS: 2 days | DRG: 419 | End: 2020-02-25
Attending: EMERGENCY MEDICINE | Admitting: INTERNAL MEDICINE
Payer: MEDICARE

## 2020-02-22 ENCOUNTER — APPOINTMENT (OUTPATIENT)
Dept: RADIOLOGY | Facility: MEDICAL CENTER | Age: 70
DRG: 419 | End: 2020-02-22
Attending: EMERGENCY MEDICINE
Payer: MEDICARE

## 2020-02-22 ENCOUNTER — OFFICE VISIT (OUTPATIENT)
Dept: URGENT CARE | Facility: CLINIC | Age: 70
End: 2020-02-22
Payer: MEDICARE

## 2020-02-22 VITALS
HEIGHT: 69 IN | DIASTOLIC BLOOD PRESSURE: 80 MMHG | BODY MASS INDEX: 27.7 KG/M2 | HEART RATE: 72 BPM | WEIGHT: 187 LBS | OXYGEN SATURATION: 99 % | SYSTOLIC BLOOD PRESSURE: 146 MMHG | TEMPERATURE: 98.3 F | RESPIRATION RATE: 16 BRPM

## 2020-02-22 DIAGNOSIS — R79.89 ABNORMAL LFTS: ICD-10-CM

## 2020-02-22 DIAGNOSIS — R17 JAUNDICE: ICD-10-CM

## 2020-02-22 DIAGNOSIS — R82.2 BILIRUBIN IN URINE: ICD-10-CM

## 2020-02-22 DIAGNOSIS — K80.50 CHOLEDOCHOLITHIASIS: ICD-10-CM

## 2020-02-22 DIAGNOSIS — R81 GLUCOSE FOUND IN URINE ON EXAMINATION: ICD-10-CM

## 2020-02-22 LAB
ALBUMIN SERPL BCP-MCNC: 4.5 G/DL (ref 3.2–4.9)
ALBUMIN/GLOB SERPL: 1.2 G/DL
ALP SERPL-CCNC: 171 U/L (ref 30–99)
ALT SERPL-CCNC: 749 U/L (ref 2–50)
ANION GAP SERPL CALC-SCNC: 13 MMOL/L (ref 7–16)
APPEARANCE UR: ABNORMAL
APPEARANCE UR: CLEAR
AST SERPL-CCNC: 247 U/L (ref 12–45)
BASOPHILS # BLD AUTO: 0.3 % (ref 0–1.8)
BASOPHILS # BLD: 0.02 K/UL (ref 0–0.12)
BILIRUB SERPL-MCNC: 4 MG/DL (ref 0.1–1.5)
BILIRUB UR QL STRIP.AUTO: ABNORMAL
BILIRUB UR STRIP-MCNC: ABNORMAL MG/DL
BUN SERPL-MCNC: 25 MG/DL (ref 8–22)
CALCIUM SERPL-MCNC: 10.1 MG/DL (ref 8.4–10.2)
CHLORIDE SERPL-SCNC: 101 MMOL/L (ref 96–112)
CO2 SERPL-SCNC: 26 MMOL/L (ref 20–33)
COLOR UR AUTO: ABNORMAL
COLOR UR: ABNORMAL
CREAT SERPL-MCNC: 1.48 MG/DL (ref 0.5–1.4)
EOSINOPHIL # BLD AUTO: 0.04 K/UL (ref 0–0.51)
EOSINOPHIL NFR BLD: 0.6 % (ref 0–6.9)
ERYTHROCYTE [DISTWIDTH] IN BLOOD BY AUTOMATED COUNT: 45.4 FL (ref 35.9–50)
GLOBULIN SER CALC-MCNC: 3.9 G/DL (ref 1.9–3.5)
GLUCOSE BLD-MCNC: 105 MG/DL (ref 70–100)
GLUCOSE SERPL-MCNC: 116 MG/DL (ref 65–99)
GLUCOSE UR STRIP.AUTO-MCNC: 100 MG/DL
GLUCOSE UR STRIP.AUTO-MCNC: NEGATIVE MG/DL
HCT VFR BLD AUTO: 53.8 % (ref 42–52)
HGB BLD-MCNC: 18.1 G/DL (ref 14–18)
IMM GRANULOCYTES # BLD AUTO: 0.02 K/UL (ref 0–0.11)
IMM GRANULOCYTES NFR BLD AUTO: 0.3 % (ref 0–0.9)
KETONES UR STRIP.AUTO-MCNC: 40 MG/DL
KETONES UR STRIP.AUTO-MCNC: ABNORMAL MG/DL
LEUKOCYTE ESTERASE UR QL STRIP.AUTO: NEGATIVE
LEUKOCYTE ESTERASE UR QL STRIP.AUTO: NEGATIVE
LIPASE SERPL-CCNC: 38 U/L (ref 7–58)
LYMPHOCYTES # BLD AUTO: 0.99 K/UL (ref 1–4.8)
LYMPHOCYTES NFR BLD: 16 % (ref 22–41)
MCH RBC QN AUTO: 32 PG (ref 27–33)
MCHC RBC AUTO-ENTMCNC: 33.6 G/DL (ref 33.7–35.3)
MCV RBC AUTO: 95.2 FL (ref 81.4–97.8)
MICRO URNS: ABNORMAL
MONOCYTES # BLD AUTO: 0.59 K/UL (ref 0–0.85)
MONOCYTES NFR BLD AUTO: 9.5 % (ref 0–13.4)
MUCOUS THREADS #/AREA URNS HPF: ABNORMAL /HPF
NEUTROPHILS # BLD AUTO: 4.54 K/UL (ref 1.82–7.42)
NEUTROPHILS NFR BLD: 73.3 % (ref 44–72)
NITRITE UR QL STRIP.AUTO: ABNORMAL
NITRITE UR QL STRIP.AUTO: NEGATIVE
NRBC # BLD AUTO: 0 K/UL
NRBC BLD-RTO: 0 /100 WBC
PH UR STRIP.AUTO: 5.5 [PH] (ref 5–8)
PH UR STRIP.AUTO: 5.5 [PH] (ref 5–8)
PLATELET # BLD AUTO: 176 K/UL (ref 164–446)
PMV BLD AUTO: 9.3 FL (ref 9–12.9)
POTASSIUM SERPL-SCNC: 3.7 MMOL/L (ref 3.6–5.5)
PROT SERPL-MCNC: 8.4 G/DL (ref 6–8.2)
PROT UR QL STRIP: 30 MG/DL
PROT UR QL STRIP: ABNORMAL MG/DL
RBC # BLD AUTO: 5.65 M/UL (ref 4.7–6.1)
RBC # URNS HPF: ABNORMAL /HPF
RBC UR QL AUTO: NEGATIVE
RBC UR QL AUTO: NEGATIVE
SODIUM SERPL-SCNC: 140 MMOL/L (ref 135–145)
SP GR UR STRIP.AUTO: 1.02
SP GR UR STRIP.AUTO: >=1.03
UNIDENT CRYS URNS QL MICRO: ABNORMAL /HPF
UROBILINOGEN UR STRIP-MCNC: 1 MG/DL
WBC # BLD AUTO: 6.2 K/UL (ref 4.8–10.8)
WBC #/AREA URNS HPF: ABNORMAL /HPF

## 2020-02-22 PROCEDURE — 83690 ASSAY OF LIPASE: CPT

## 2020-02-22 PROCEDURE — 85025 COMPLETE CBC W/AUTO DIFF WBC: CPT

## 2020-02-22 PROCEDURE — 80053 COMPREHEN METABOLIC PANEL: CPT

## 2020-02-22 PROCEDURE — 76705 ECHO EXAM OF ABDOMEN: CPT

## 2020-02-22 PROCEDURE — 99285 EMERGENCY DEPT VISIT HI MDM: CPT

## 2020-02-22 PROCEDURE — 700101 HCHG RX REV CODE 250: Performed by: INTERNAL MEDICINE

## 2020-02-22 PROCEDURE — 82962 GLUCOSE BLOOD TEST: CPT | Performed by: NURSE PRACTITIONER

## 2020-02-22 PROCEDURE — 99214 OFFICE O/P EST MOD 30 MIN: CPT | Performed by: NURSE PRACTITIONER

## 2020-02-22 PROCEDURE — 81002 URINALYSIS NONAUTO W/O SCOPE: CPT | Performed by: NURSE PRACTITIONER

## 2020-02-22 PROCEDURE — 99219 PR INITIAL OBSERVATION CARE,LEVL II: CPT | Performed by: INTERNAL MEDICINE

## 2020-02-22 PROCEDURE — 36415 COLL VENOUS BLD VENIPUNCTURE: CPT

## 2020-02-22 PROCEDURE — G0378 HOSPITAL OBSERVATION PER HR: HCPCS

## 2020-02-22 PROCEDURE — 81001 URINALYSIS AUTO W/SCOPE: CPT

## 2020-02-22 RX ORDER — ONDANSETRON 2 MG/ML
4 INJECTION INTRAMUSCULAR; INTRAVENOUS EVERY 4 HOURS PRN
Status: DISCONTINUED | OUTPATIENT
Start: 2020-02-22 | End: 2020-02-25 | Stop reason: HOSPADM

## 2020-02-22 RX ORDER — BISACODYL 10 MG
10 SUPPOSITORY, RECTAL RECTAL
Status: DISCONTINUED | OUTPATIENT
Start: 2020-02-22 | End: 2020-02-22

## 2020-02-22 RX ORDER — SODIUM CHLORIDE AND POTASSIUM CHLORIDE 150; 900 MG/100ML; MG/100ML
INJECTION, SOLUTION INTRAVENOUS CONTINUOUS
Status: DISCONTINUED | OUTPATIENT
Start: 2020-02-22 | End: 2020-02-25 | Stop reason: HOSPADM

## 2020-02-22 RX ORDER — AMOXICILLIN 250 MG
2 CAPSULE ORAL 2 TIMES DAILY
Status: DISCONTINUED | OUTPATIENT
Start: 2020-02-22 | End: 2020-02-22

## 2020-02-22 RX ORDER — ONDANSETRON 4 MG/1
4 TABLET, ORALLY DISINTEGRATING ORAL EVERY 4 HOURS PRN
Status: DISCONTINUED | OUTPATIENT
Start: 2020-02-22 | End: 2020-02-25 | Stop reason: HOSPADM

## 2020-02-22 RX ORDER — OMEPRAZOLE 20 MG/1
20 CAPSULE, DELAYED RELEASE ORAL DAILY
Status: DISCONTINUED | OUTPATIENT
Start: 2020-02-23 | End: 2020-02-25 | Stop reason: HOSPADM

## 2020-02-22 RX ORDER — POLYETHYLENE GLYCOL 3350 17 G/17G
1 POWDER, FOR SOLUTION ORAL
Status: DISCONTINUED | OUTPATIENT
Start: 2020-02-22 | End: 2020-02-22

## 2020-02-22 RX ORDER — ASPIRIN 325 MG
650 TABLET ORAL EVERY 6 HOURS PRN
COMMUNITY
End: 2021-06-28

## 2020-02-22 RX ORDER — TEMAZEPAM 15 MG/1
15 CAPSULE ORAL
Status: DISCONTINUED | OUTPATIENT
Start: 2020-02-22 | End: 2020-02-25 | Stop reason: HOSPADM

## 2020-02-22 RX ADMIN — POTASSIUM CHLORIDE AND SODIUM CHLORIDE: 900; 150 INJECTION, SOLUTION INTRAVENOUS at 23:30

## 2020-02-22 ASSESSMENT — ENCOUNTER SYMPTOMS
COUGH: 0
BLOOD IN STOOL: 0
FEVER: 1
DIZZINESS: 0
VOMITING: 1
DIARRHEA: 0
BACK PAIN: 1
CHILLS: 1
CONSTIPATION: 0
NAUSEA: 1
WEAKNESS: 0
NERVOUS/ANXIOUS: 0
SORE THROAT: 0
FOCAL WEAKNESS: 0
SHORTNESS OF BREATH: 0
HEADACHES: 0
DIAPHORESIS: 1
ABDOMINAL PAIN: 1

## 2020-02-22 NOTE — PROGRESS NOTES
"  Subjective:     Marquez Crawford is a 69 y.o. male who presents for Sinus Problem (C/o RT flank pain, dark urine, green sinus discharge, poss low grade fever x5 days)      Flank pain the last copule of days. Bilateral back pain, right side. Started as a \"stomach type thing\". Feels like a sore back. Laying in bed x 4 days, sick. Last few days, dark urine. Vomiting Wednesday. Upset stomach. No diarrhea. Was constipated, now resolved. Hasn't been drinking water. No DM hx. Took ASA. Drinks alcohol, 2--3 oz vinh nightly. Stopped a few days ago. No urine issues. No liver or hepatitis hx.    Sinus symptoms. Green nasasl discharge. Granddaughter sick. Low grade fever. No cough. No SOB.     Sinus Problem   This is a recurrent problem. Associated symptoms include congestion. Pertinent negatives include no coughing, ear pain, shortness of breath or sore throat.   Abdominal Pain   This is a new problem. The current episode started in the past 7 days. The problem occurs daily. The problem has been gradually worsening. The abdominal pain radiates to the epigastric region. Associated symptoms include a fever, nausea and vomiting. Pertinent negatives include no diarrhea, dysuria, hematochezia or hematuria. Nothing aggravates the pain. The pain is relieved by nothing. The treatment provided mild relief.       History reviewed. No pertinent past medical history.    Past Surgical History:   Procedure Laterality Date   • EDILBERTO BY LAPAROSCOPY N/A 2/24/2020    Procedure: CHOLECYSTECTOMY, LAPAROSCOPIC;  Surgeon: Catherine Morocho M.D.;  Location: SURGERY [facility];  Service: General       Social History     Socioeconomic History   • Marital status:      Spouse name: Not on file   • Number of children: Not on file   • Years of education: Not on file   • Highest education level: Not on file   Occupational History   • Not on file   Social Needs   • Financial resource strain: Not on file   • Food insecurity     Worry: " Not on file     Inability: Not on file   • Transportation needs     Medical: Not on file     Non-medical: Not on file   Tobacco Use   • Smoking status: Never Smoker   • Smokeless tobacco: Never Used   Substance and Sexual Activity   • Alcohol use: Yes   • Drug use: Yes     Types: Marijuana   • Sexual activity: Yes     Partners: Female   Lifestyle   • Physical activity     Days per week: Not on file     Minutes per session: Not on file   • Stress: Not on file   Relationships   • Social connections     Talks on phone: Not on file     Gets together: Not on file     Attends Rastafari service: Not on file     Active member of club or organization: Not on file     Attends meetings of clubs or organizations: Not on file     Relationship status: Not on file   • Intimate partner violence     Fear of current or ex partner: Not on file     Emotionally abused: Not on file     Physically abused: Not on file     Forced sexual activity: Not on file   Other Topics Concern   • Not on file   Social History Narrative   • Not on file        History reviewed. No pertinent family history.     Allergies   Allergen Reactions   • Lipitor [Atorvastatin] Myalgia   • Statins [Hmg-Coa-R Inhibitors]      Blister and the severe joint problem       Review of Systems   Constitutional: Positive for fever and malaise/fatigue.   HENT: Positive for congestion. Negative for ear discharge, ear pain and sore throat.    Eyes: Negative for pain, discharge and redness.   Respiratory: Negative for cough, shortness of breath and wheezing.    Gastrointestinal: Positive for abdominal pain, nausea and vomiting. Negative for blood in stool, diarrhea and hematochezia.   Genitourinary: Positive for flank pain. Negative for dysuria and hematuria.   Musculoskeletal: Positive for back pain.   Skin: Negative for rash.   All other systems reviewed and are negative.       Objective:   /80 (BP Location: Left arm, Patient Position: Sitting, BP Cuff Size: Adult)    "Pulse 72   Temp 36.8 °C (98.3 °F) (Temporal)   Resp 16   Ht 1.753 m (5' 9\")   Wt 84.8 kg (187 lb)   SpO2 99%   BMI 27.62 kg/m²     Physical Exam  Vitals signs reviewed.   Constitutional:       General: He is not in acute distress.     Appearance: He is well-developed.   HENT:      Head: Normocephalic and atraumatic.      Right Ear: Tympanic membrane, ear canal and external ear normal.      Left Ear: Tympanic membrane, ear canal and external ear normal.      Nose: Mucosal edema present.      Right Sinus: No maxillary sinus tenderness or frontal sinus tenderness.      Left Sinus: No maxillary sinus tenderness or frontal sinus tenderness.      Mouth/Throat:      Mouth: Mucous membranes are moist.      Pharynx: Oropharynx is clear. Uvula midline.   Eyes:      Conjunctiva/sclera: Conjunctivae normal.      Comments: Jaundice sclera, lids.    Neck:      Musculoskeletal: Normal range of motion.   Cardiovascular:      Rate and Rhythm: Normal rate and regular rhythm.   Pulmonary:      Effort: Pulmonary effort is normal. No respiratory distress.      Breath sounds: Normal breath sounds.   Abdominal:      General: Bowel sounds are normal. There is no distension.      Palpations: Abdomen is soft. There is no hepatomegaly.      Tenderness: There is no abdominal tenderness. There is no right CVA tenderness, left CVA tenderness, guarding or rebound.   Musculoskeletal: Normal range of motion.   Lymphadenopathy:      Head:      Right side of head: No submental, submandibular, tonsillar, preauricular, posterior auricular or occipital adenopathy.      Left side of head: No submental, submandibular, tonsillar, preauricular, posterior auricular or occipital adenopathy.   Skin:     General: Skin is warm and dry.      Coloration: Skin is jaundiced.      Findings: No rash.   Neurological:      General: No focal deficit present.      Mental Status: He is alert and oriented to person, place, and time.      GCS: GCS eye subscore is 4. " GCS verbal subscore is 5. GCS motor subscore is 6.   Psychiatric:         Mood and Affect: Mood normal.         Speech: Speech normal.         Behavior: Behavior normal.         Thought Content: Thought content normal.         Judgment: Judgment normal.         Assessment/Plan:   1. Jaundice    2. Bilirubin in urine    3. Glucose found in urine on examination  - POCT Urinalysis  - POCT Glucose  Results for orders placed or performed in visit on 02/22/20   POCT Urinalysis   Result Value Ref Range    POC Color Tracee Negative    POC Appearance Slightly cloudy Negative    POC Leukocyte Esterase Negative Negative    POC Nitrites Negative Negative    POC Urobiligen 1.0 Negative (0.2) mg/dL    POC Protein 30 Negative mg/dL    POC Urine PH 5.5 5.0 - 8.0    POC Blood Negative Negative    POC Specific Gravity >=1.030 <1.005 - >1.030    POC Ketones 40 Negative mg/dL    POC Bilirubin Lrg Negative mg/dL    POC Glucose 100 Negative mg/dL   POCT Glucose   Result Value Ref Range    Glucose - Accu-Ck 105 (A) 70 - 100 mg/dL     Referred to ER for further evaluation. Stable vitals. Discussed concerns for liver or gallbladder issue. Going to [place], pt contacted his wife who'll meet him there.     Differential diagnosis, natural history, supportive care, and indications for immediate follow-up discussed.

## 2020-02-23 ENCOUNTER — APPOINTMENT (OUTPATIENT)
Dept: RADIOLOGY | Facility: MEDICAL CENTER | Age: 70
DRG: 419 | End: 2020-02-23
Attending: INTERNAL MEDICINE
Payer: MEDICARE

## 2020-02-23 PROBLEM — K80.50 CHOLEDOCHOLITHIASIS: Status: RESOLVED | Noted: 2020-02-22 | Resolved: 2020-02-23

## 2020-02-23 LAB
ALBUMIN SERPL BCP-MCNC: 3.3 G/DL (ref 3.2–4.9)
ALBUMIN/GLOB SERPL: 1.2 G/DL
ALP SERPL-CCNC: 125 U/L (ref 30–99)
ALT SERPL-CCNC: 446 U/L (ref 2–50)
ANION GAP SERPL CALC-SCNC: 13 MMOL/L (ref 7–16)
AST SERPL-CCNC: 129 U/L (ref 12–45)
BASOPHILS # BLD AUTO: 0.2 % (ref 0–1.8)
BASOPHILS # BLD: 0.01 K/UL (ref 0–0.12)
BILIRUB SERPL-MCNC: 1.8 MG/DL (ref 0.1–1.5)
BUN SERPL-MCNC: 29 MG/DL (ref 8–22)
CALCIUM SERPL-MCNC: 8.6 MG/DL (ref 8.4–10.2)
CHLORIDE SERPL-SCNC: 106 MMOL/L (ref 96–112)
CO2 SERPL-SCNC: 22 MMOL/L (ref 20–33)
CREAT SERPL-MCNC: 1.21 MG/DL (ref 0.5–1.4)
EOSINOPHIL # BLD AUTO: 0.11 K/UL (ref 0–0.51)
EOSINOPHIL NFR BLD: 2.2 % (ref 0–6.9)
ERYTHROCYTE [DISTWIDTH] IN BLOOD BY AUTOMATED COUNT: 46.3 FL (ref 35.9–50)
GLOBULIN SER CALC-MCNC: 2.7 G/DL (ref 1.9–3.5)
GLUCOSE SERPL-MCNC: 138 MG/DL (ref 65–99)
HCT VFR BLD AUTO: 43.5 % (ref 42–52)
HGB BLD-MCNC: 14.6 G/DL (ref 14–18)
IMM GRANULOCYTES # BLD AUTO: 0.02 K/UL (ref 0–0.11)
IMM GRANULOCYTES NFR BLD AUTO: 0.4 % (ref 0–0.9)
LYMPHOCYTES # BLD AUTO: 1.08 K/UL (ref 1–4.8)
LYMPHOCYTES NFR BLD: 21.3 % (ref 22–41)
MCH RBC QN AUTO: 32.2 PG (ref 27–33)
MCHC RBC AUTO-ENTMCNC: 33.6 G/DL (ref 33.7–35.3)
MCV RBC AUTO: 96 FL (ref 81.4–97.8)
MONOCYTES # BLD AUTO: 0.8 K/UL (ref 0–0.85)
MONOCYTES NFR BLD AUTO: 15.8 % (ref 0–13.4)
NEUTROPHILS # BLD AUTO: 3.04 K/UL (ref 1.82–7.42)
NEUTROPHILS NFR BLD: 60.1 % (ref 44–72)
NRBC # BLD AUTO: 0 K/UL
NRBC BLD-RTO: 0 /100 WBC
PLATELET # BLD AUTO: 150 K/UL (ref 164–446)
PMV BLD AUTO: 9.4 FL (ref 9–12.9)
POTASSIUM SERPL-SCNC: 3.7 MMOL/L (ref 3.6–5.5)
PROT SERPL-MCNC: 6 G/DL (ref 6–8.2)
RBC # BLD AUTO: 4.53 M/UL (ref 4.7–6.1)
SODIUM SERPL-SCNC: 141 MMOL/L (ref 135–145)
WBC # BLD AUTO: 5.1 K/UL (ref 4.8–10.8)

## 2020-02-23 PROCEDURE — 700102 HCHG RX REV CODE 250 W/ 637 OVERRIDE(OP): Performed by: INTERNAL MEDICINE

## 2020-02-23 PROCEDURE — 85025 COMPLETE CBC W/AUTO DIFF WBC: CPT

## 2020-02-23 PROCEDURE — 80053 COMPREHEN METABOLIC PANEL: CPT

## 2020-02-23 PROCEDURE — A9270 NON-COVERED ITEM OR SERVICE: HCPCS | Performed by: INTERNAL MEDICINE

## 2020-02-23 PROCEDURE — 36415 COLL VENOUS BLD VENIPUNCTURE: CPT

## 2020-02-23 PROCEDURE — 74181 MRI ABDOMEN W/O CONTRAST: CPT

## 2020-02-23 PROCEDURE — 99233 SBSQ HOSP IP/OBS HIGH 50: CPT | Performed by: INTERNAL MEDICINE

## 2020-02-23 PROCEDURE — 700101 HCHG RX REV CODE 250: Performed by: INTERNAL MEDICINE

## 2020-02-23 PROCEDURE — 770006 HCHG ROOM/CARE - MED/SURG/GYN SEMI*

## 2020-02-23 RX ORDER — FLUTICASONE PROPIONATE 50 MCG
1 SPRAY, SUSPENSION (ML) NASAL PRN
COMMUNITY
End: 2021-06-28

## 2020-02-23 RX ADMIN — OMEPRAZOLE 20 MG: 20 CAPSULE, DELAYED RELEASE ORAL at 06:29

## 2020-02-23 RX ADMIN — POTASSIUM CHLORIDE AND SODIUM CHLORIDE: 900; 150 INJECTION, SOLUTION INTRAVENOUS at 11:00

## 2020-02-23 ASSESSMENT — LIFESTYLE VARIABLES
TOTAL SCORE: 0
AVERAGE NUMBER OF DAYS PER WEEK YOU HAVE A DRINK CONTAINING ALCOHOL: 7
HAVE YOU EVER FELT YOU SHOULD CUT DOWN ON YOUR DRINKING: NO
HOW MANY TIMES IN THE PAST YEAR HAVE YOU HAD 5 OR MORE DRINKS IN A DAY: 0
ALCOHOL_USE: YES
TOTAL SCORE: 0
EVER FELT BAD OR GUILTY ABOUT YOUR DRINKING: NO
EVER HAD A DRINK FIRST THING IN THE MORNING TO STEADY YOUR NERVES TO GET RID OF A HANGOVER: NO
TOTAL SCORE: 0
EVER_SMOKED: YES
CONSUMPTION TOTAL: NEGATIVE
ON A TYPICAL DAY WHEN YOU DRINK ALCOHOL HOW MANY DRINKS DO YOU HAVE: .5
HAVE PEOPLE ANNOYED YOU BY CRITICIZING YOUR DRINKING: NO

## 2020-02-23 ASSESSMENT — ENCOUNTER SYMPTOMS
CONSTITUTIONAL NEGATIVE: 1
WEAKNESS: 0
BLOOD IN STOOL: 0
DEPRESSION: 0
ABDOMINAL PAIN: 1
SHORTNESS OF BREATH: 0
PALPITATIONS: 0
PSYCHIATRIC NEGATIVE: 1
MYALGIAS: 0
EYES NEGATIVE: 1
CHILLS: 0
SINUS PAIN: 0
EYE PAIN: 0
ABDOMINAL PAIN: 0
BACK PAIN: 0
VOMITING: 0
FOCAL WEAKNESS: 0
HEADACHES: 0
CONSTIPATION: 0
SORE THROAT: 0
INSOMNIA: 0
VOMITING: 1
FEVER: 0
FLANK PAIN: 0
DIARRHEA: 0
DIZZINESS: 0
TREMORS: 0
SEIZURES: 0
NEUROLOGICAL NEGATIVE: 1
HALLUCINATIONS: 0
GASTROINTESTINAL NEGATIVE: 1
RESPIRATORY NEGATIVE: 1
NECK PAIN: 0
WHEEZING: 0
MUSCULOSKELETAL NEGATIVE: 1
CARDIOVASCULAR NEGATIVE: 1
BRUISES/BLEEDS EASILY: 0
NAUSEA: 0
DIAPHORESIS: 0

## 2020-02-23 ASSESSMENT — COGNITIVE AND FUNCTIONAL STATUS - GENERAL
MOBILITY SCORE: 24
SUGGESTED CMS G CODE MODIFIER DAILY ACTIVITY: CH
DAILY ACTIVITIY SCORE: 24
SUGGESTED CMS G CODE MODIFIER MOBILITY: CH

## 2020-02-23 ASSESSMENT — PATIENT HEALTH QUESTIONNAIRE - PHQ9
2. FEELING DOWN, DEPRESSED, IRRITABLE, OR HOPELESS: NOT AT ALL
1. LITTLE INTEREST OR PLEASURE IN DOING THINGS: NOT AT ALL
SUM OF ALL RESPONSES TO PHQ9 QUESTIONS 1 AND 2: 0

## 2020-02-23 NOTE — H&P
"Hospital Medicine History & Physical Note    Date of Service  2/22/2020    Primary Care Physician  Pcp Pt States None    Consultants  none    Code Status  Full    Chief Complaint  Brown urine    History of Presenting Illness  69 y.o. male who presented 2/22/2020 with brown urine since this morning he was seen in urgent care and subsequently sent here.  Patient states that starting Tuesday and Wednesday he had epigastric aching 4 out of 10.  He had some nausea and one episode of emesis.  His nausea, pain and so forth had started to completely resolved by yesterday evening and he has had no symptoms today.  He thinks Wednesday night he had some fever but he did not measure his temperature he felt a little sweaty and chilled.  Wednesday he had a \" blonde\" stool but today he had a dark green stool, he had stool later today that was somewhat lighter.  He denies diarrhea.  The pain seemed to subside on its own  he did not notice any exacerbating or relieving factors.  He noticed today that his eyes were yellow.     Review of Systems  Review of Systems   Constitutional: Positive for chills (wednesday), diaphoresis (wednesday) and fever (wednesday).   HENT: Negative for congestion and sore throat.    Eyes:        Icterus today   Respiratory: Negative for cough and shortness of breath.    Cardiovascular: Negative for chest pain.   Gastrointestinal: Positive for abdominal pain, nausea (Wednesday) and vomiting (thursday). Negative for blood in stool, constipation, diarrhea and melena.        \"blonde stool\" Wednesday  Dark Green this AM, lighter green after   Genitourinary: Negative for dysuria.        Dark urine since this AM   Musculoskeletal: Positive for back pain (2/2 bed).   Skin: Negative for itching.   Neurological: Negative for dizziness, focal weakness, weakness and headaches.   Psychiatric/Behavioral: The patient is not nervous/anxious.         Denies claustrophobia       Past Medical History  GERD, " dyslipidemia    Surgical History  Torn meniscus, tonsillectomy    Family History  Father was diabetic had cardiomyopathy and an AICD  His mother has no significant medical problems    Social History   reports that he has never smoked. He has never used smokeless tobacco. He reports current alcohol use. He reports current drug use. Drug: Marijuana.    Allergies  Allergies   Allergen Reactions   • Lipitor [Atorvastatin]    • Statins [Hmg-Coa-R Inhibitors]      Blister and the severe joint problem       Medications  Prior to Admission Medications   Prescriptions Last Dose Informant Patient Reported? Taking?   Lansoprazole (PREVACID PO)   Yes No   Sig: Take  by mouth.   aspirin (ASA) 325 MG Tab   Yes No   Sig: Take 325 mg by mouth every 6 hours as needed.   fluticasone (FLONASE) 50 MCG/ACT nasal spray   No No   Sig: Spray 2 Sprays in nose every day.   Patient not taking: Reported on 2/22/2020   pravastatin (PRAVACHOL) 10 MG Tab   No No   Sig: Take 1 Tab by mouth every day.   Patient not taking: Reported on 2/22/2020   sodium chloride (OCEAN NASAL SPRAY) 0.65 % Solution   No No   Sig: Spray 2 Sprays in nose every 2 hours as needed for Congestion.   Patient not taking: Reported on 2/22/2020      Facility-Administered Medications: None       Physical Exam  Temp:  [36.7 °C (98 °F)] 36.7 °C (98 °F)  Pulse:  [71-89] 71  Resp:  [18] 18  BP: (115-140)/(83-94) 115/83  SpO2:  [90 %-95 %] 94 %    Physical Exam  Vitals signs and nursing note reviewed.   Constitutional:       General: He is not in acute distress.     Appearance: Normal appearance. He is normal weight. He is not ill-appearing, toxic-appearing or diaphoretic.   HENT:      Head: Normocephalic and atraumatic.      Right Ear: External ear normal.      Left Ear: External ear normal.      Nose: Nose normal.      Mouth/Throat:      Mouth: Mucous membranes are moist.      Pharynx: Oropharynx is clear.   Eyes:      General: Scleral icterus present.      Extraocular Movements:  Extraocular movements intact.      Conjunctiva/sclera: Conjunctivae normal.      Pupils: Pupils are equal, round, and reactive to light.   Cardiovascular:      Rate and Rhythm: Normal rate and regular rhythm.   Pulmonary:      Effort: Pulmonary effort is normal.      Breath sounds: Normal breath sounds.   Abdominal:      General: Bowel sounds are normal. There is no distension.      Palpations: Abdomen is soft. There is no mass.      Tenderness: There is no abdominal tenderness.   Musculoskeletal:      Right lower leg: No edema.   Skin:     Coloration: Skin is jaundiced.   Neurological:      General: No focal deficit present.      Mental Status: He is alert and oriented to person, place, and time. Mental status is at baseline.   Psychiatric:         Mood and Affect: Mood normal.         Laboratory:  Recent Labs     02/22/20  1659   WBC 6.2   RBC 5.65   HEMOGLOBIN 18.1*   HEMATOCRIT 53.8*   MCV 95.2   MCH 32.0   MCHC 33.6*   RDW 45.4   PLATELETCT 176   MPV 9.3     Recent Labs     02/22/20  1659   SODIUM 140   POTASSIUM 3.7   CHLORIDE 101   CO2 26   GLUCOSE 116*   BUN 25*   CREATININE 1.48*   CALCIUM 10.1     Recent Labs     02/22/20  1659   ALTSGPT 749*   ASTSGOT 247*   ALKPHOSPHAT 171*   TBILIRUBIN 4.0*   LIPASE 38   GLUCOSE 116*         No results for input(s): NTPROBNP in the last 72 hours.      No results for input(s): TROPONINT in the last 72 hours.    Urinalysis:    Recent Labs     02/22/20  1705   SPECGRAVITY 1.025   GLUCOSEUR Negative   KETONES See Comment   NITRITE See Comment   LEUKESTERAS Negative   WBCURINE 0-2*   RBCURINE Rare        Imaging:  US-RUQ   Final Result      1.  Sludge within the gallbladder. No gallstones seen. No biliary ductal dilatation.      2.  The liver is echogenic consistent with fatty change versus hepatocellular dysfunction.      [id number]-E/O    (Results Pending)         Assessment/Plan:  I anticipate this patient is appropriate for observation status at this  time.    Choledocholithiasis  Assessment & Plan  With abnormal LFT  Check MRCP    GERD (gastroesophageal reflux disease)- (present on admission)  Assessment & Plan  Continue PPI      VTE prophylaxis: SCDs

## 2020-02-23 NOTE — ED TRIAGE NOTES
"Pt was seen at [facility] earlier today and evaluated for diffuse abdominal pain and right flank pain recurring for the past 5 days. He was referred to our facility for further management.  Chief Complaint   Patient presents with   • Abdominal Pain   • N/V     /94   Pulse 89   Temp 36.7 °C (98 °F) (Temporal)   Resp 18   Ht 1.753 m (5' 9\")   Wt 84 kg (185 lb 3 oz)   SpO2 90%   BMI 27.35 kg/m²       "

## 2020-02-23 NOTE — PROGRESS NOTES
Pt arrived via gurney, admitted to room 221-2 from ER  Pt is A&Ox4, denied any abdominal pain at this moment  Denied nausea/vomiting   IVF - NS with 20 KCL infusing at 100mL/hr  Pt on clear liquid diet and being aware of MRCP in AM  Oriented to room call light and smoking policy.   Fall precaution in place.   Bed locked & at lowest position.   Call light within reach.   Encouraged pt the importance to call for assistance.   Continue to monitor.

## 2020-02-23 NOTE — CARE PLAN
Problem: Safety  Goal: Will remain free from injury  Outcome: PROGRESSING AS EXPECTED  Note: Pt is injury-free at this moment   Fall precautions in place including: bed locked & at lowest position, call light & personal belongings within reach, x2 upper bed rails up   Encouraged pt to call for any assistance. Hourly rounding in place      Problem: Knowledge Deficit  Goal: Knowledge of disease process/condition, treatment plan, diagnostic tests, and medications will improve  Outcome: PROGRESSING AS EXPECTED  Note: Pt will have MRCP today

## 2020-02-23 NOTE — CARE PLAN
Problem: Knowledge Deficit  Goal: Knowledge of disease process/condition, treatment plan, diagnostic tests, and medications will improve  Outcome: PROGRESSING AS EXPECTED  Goal: Knowledge of the prescribed therapeutic regimen will improve  Outcome: PROGRESSING AS EXPECTED    Aware of need for further testing and that a plan will be developed today about what comes next.

## 2020-02-23 NOTE — ED PROVIDER NOTES
"ED Provider Note    CHIEF COMPLAINT  Chief Complaint   Patient presents with   • Abdominal Pain   • N/V       HPI  HPI     69 y.o. male presents to the emergency department with chief complaint of upper abdominal pain.    Patient states that starting Tuesday and Wednesday he had epigastric aching 4 out of 10.  He had some nausea and one episode of emesis.  His nausea, pain and so forth had started to completely resolved by yesterday evening and he has had no symptoms today.  He thinks Wednesday night he had some fever but he did not measure his temperature he felt a little sweaty and chilled.  Wednesday he had a \" blonde\" stool but today he had a dark green stool, he had stool later today that was somewhat lighter.  He denies diarrhea.  The pain seemed to subside on its own  he did not notice any exacerbating or relieving factors.  He noticed today that his eyes were yellow.     REVIEW OF SYSTEMS  Review of Systems   Constitutional: Negative.  Negative for fever.   HENT: Negative.  Negative for ear pain and sore throat.    Eyes: Negative.  Negative for pain.   Respiratory: Negative.  Negative for shortness of breath.    Cardiovascular: Negative.  Negative for chest pain.   Gastrointestinal: Positive for abdominal pain and vomiting. Negative for blood in stool.   Genitourinary: Negative for dysuria and flank pain.   Musculoskeletal: Negative for back pain, myalgias and neck pain.   Skin: Negative.  Negative for rash.   Neurological: Negative for focal weakness, seizures, weakness and headaches.   Endo/Heme/Allergies: Does not bruise/bleed easily.   Psychiatric/Behavioral: Negative for hallucinations and suicidal ideas.   All other systems reviewed and are negative.      PAST MEDICAL HISTORY       SOCIAL HISTORY  Social History     Tobacco Use   • Smoking status: Never Smoker   • Smokeless tobacco: Never Used   Substance and Sexual Activity   • Alcohol use: Yes   • Drug use: Yes     Types: Marijuana   • Sexual " "activity: Yes     Partners: Female       SURGICAL HISTORY  patient denies any surgical history    CURRENT MEDICATIONS  Home Medications    **Home medications have not yet been reviewed for this encounter**         ALLERGIES  Allergies   Allergen Reactions   • Lipitor [Atorvastatin]    • Statins [Hmg-Coa-R Inhibitors]      Blister and the severe joint problem       PHYSICAL EXAM  VITAL SIGNS: /90   Pulse 70   Temp 36.8 °C (98.3 °F) (Oral)   Resp 18   Ht 1.753 m (5' 9\")   Wt 84 kg (185 lb 3 oz)   SpO2 94%   BMI 27.35 kg/m²  @ANAID[[id number]::@  Pulse ox interpretation: I interpret this pulse ox as normal.    Physical Exam   Constitutional: He is oriented to person, place, and time and well-developed, well-nourished, and in no distress.   HENT:   Head: Normocephalic.   Right Ear: External ear normal.   Left Ear: External ear normal.   Mouth/Throat: No oropharyngeal exudate.   Eyes: Pupils are equal, round, and reactive to light. EOM are normal. No scleral icterus.   Neck: Normal range of motion.   Cardiovascular: Normal rate.   Pulmonary/Chest: Effort normal. No respiratory distress.   Abdominal: He exhibits no distension. There is abdominal tenderness (RUQ  TTP).   Musculoskeletal: Normal range of motion.         General: No deformity.   Neurological: He is alert and oriented to person, place, and time. Coordination normal.   Skin: Skin is warm and dry. No rash noted. No erythema.   Psychiatric: Affect and judgment normal.   Nursing note and vitals reviewed.  +mild scleral icterus    DIAGNOSTIC STUDIES / PROCEDURES        LABS/EKG  Results for orders placed or performed during the hospital encounter of 02/22/20   CBC WITH DIFFERENTIAL   Result Value Ref Range    WBC 6.2 4.8 - 10.8 K/uL    RBC 5.65 4.70 - 6.10 M/uL    Hemoglobin 18.1 (H) 14.0 - 18.0 g/dL    Hematocrit 53.8 (H) 42.0 - 52.0 %    MCV 95.2 81.4 - 97.8 fL    MCH 32.0 27.0 - 33.0 pg    MCHC 33.6 (L) 33.7 - 35.3 g/dL    RDW 45.4 35.9 - 50.0 fL    " Platelet Count 176 164 - 446 K/uL    MPV 9.3 9.0 - 12.9 fL    Neutrophils-Polys 73.30 (H) 44.00 - 72.00 %    Lymphocytes 16.00 (L) 22.00 - 41.00 %    Monocytes 9.50 0.00 - 13.40 %    Eosinophils 0.60 0.00 - 6.90 %    Basophils 0.30 0.00 - 1.80 %    Immature Granulocytes 0.30 0.00 - 0.90 %    Nucleated RBC 0.00 /100 WBC    Neutrophils (Absolute) 4.54 1.82 - 7.42 K/uL    Lymphs (Absolute) 0.99 (L) 1.00 - 4.80 K/uL    Monos (Absolute) 0.59 0.00 - 0.85 K/uL    Eos (Absolute) 0.04 0.00 - 0.51 K/uL    Baso (Absolute) 0.02 0.00 - 0.12 K/uL    Immature Granulocytes (abs) 0.02 0.00 - 0.11 K/uL    NRBC (Absolute) 0.00 K/uL   COMP METABOLIC PANEL   Result Value Ref Range    Sodium 140 135 - 145 mmol/L    Potassium 3.7 3.6 - 5.5 mmol/L    Chloride 101 96 - 112 mmol/L    Co2 26 20 - 33 mmol/L    Anion Gap 13.0 7.0 - 16.0    Glucose 116 (H) 65 - 99 mg/dL    Bun 25 (H) 8 - 22 mg/dL    Creatinine 1.48 (H) 0.50 - 1.40 mg/dL    Calcium 10.1 8.4 - 10.2 mg/dL    AST(SGOT) 247 (H) 12 - 45 U/L    ALT(SGPT) 749 (H) 2 - 50 U/L    Alkaline Phosphatase 171 (H) 30 - 99 U/L    Total Bilirubin 4.0 (H) 0.1 - 1.5 mg/dL    Albumin 4.5 3.2 - 4.9 g/dL    Total Protein 8.4 (H) 6.0 - 8.2 g/dL    Globulin 3.9 (H) 1.9 - 3.5 g/dL    A-G Ratio 1.2 g/dL   LIPASE   Result Value Ref Range    Lipase 38 7 - 58 U/L   URINALYSIS,CULTURE IF INDICATED   Result Value Ref Range    Color Tracee     Character Clear     Specific Gravity 1.025 <1.035    Ph 5.5 5.0 - 8.0    Glucose Negative Negative mg/dL    Ketones See Comment Negative mg/dL    Protein See Comment Negative mg/dL    Bilirubin Large (A) Negative    Nitrite See Comment Negative    Leukocyte Esterase Negative Negative    Occult Blood Negative Negative    Micro Urine Req Microscopic    URINE MICROSCOPIC (W/UA)   Result Value Ref Range    WBC 0-2 (A) /hpf    RBC Rare /hpf    Mucous Threads Moderate /hpf    Urine Crystals Few Amorphous /hpf   ESTIMATED GFR   Result Value Ref Range    GFR If  57  (A) >60 mL/min/1.73 m 2    GFR If Non  47 (A) >60 mL/min/1.73 m 2       RADIOLOGY  US-RUQ   Final Result      1.  Sludge within the gallbladder. No gallstones seen. No biliary ductal dilatation.      2.  The liver is echogenic consistent with fatty change versus hepatocellular dysfunction.      [id number]-B/O    (Results Pending)        COURSE & MEDICAL DECISION MAKING  Pertinent Labs & Imaging studies reviewed by me. (See chart for details)    69 y.o. male  p/w abd pain.     Differential diagnosis includes but is not limited to:  #abdominal pain    No RLQ pain, TTP or fever to suggest appendicitis  No LLQ pain or TTP or fever to suggest diverticulitis  No pain at of proportion to suggest mesenteric ischemia  No e/o rash or zoster  No e/o UTI  No elevation in lipase to suggest pancreatitis  US of abd w/ no acute cholecystitis, however given elevated liver enzymes plan for medicine admission and MRCP, along trending liver enzymes      FINAL IMPRESSION  Visit Diagnoses     ICD-10-CM   1. Abnormal LFTs R94.5   2. Jaundice           Electronically signed by: Yusuf Curran M.D., 2/23/2020 2:03 AM

## 2020-02-23 NOTE — ASSESSMENT & PLAN NOTE
With abnormal LFT  MRCP reviewed by myself with Dr. Sher 2/23 and stones are seen in the common duct  GI and surgery are consulted and procedure planned for today  Will order repeat labs

## 2020-02-23 NOTE — PROGRESS NOTES
"Hospital Medicine Daily Progress Note    Date of Service  2/23/2020    Chief Complaint  69 y.o. male admitted 2/22/2020 with abdominal pain    Hospital Course    This is a 69 y.o. male who presented 2/22/2020 with brown urine so he was seen in urgent care and subsequently sent here.  Patient states he had abdominal pain, Tuesday and Wednesday he had epigastric aching 4 out of 10.  He had some nausea and one episode of emesis.  His stool was \"blonde\", his skin was yellow and urine was dark. His pain resolved and urine turned lighter in color but MRCP showed common bile duct stones.        Interval Problem Update  The patient has no more abdominal pain, MRCP shows stones    Consultants/Specialty  GI Dr. Turcios  Surgery Dr. Morocho    Code Status  full    Disposition  home    Review of Systems  Review of Systems   Constitutional: Negative for chills, diaphoresis, fever and malaise/fatigue.   HENT: Negative for sinus pain and sore throat.    Respiratory: Negative for shortness of breath and wheezing.    Cardiovascular: Negative for chest pain, palpitations and leg swelling.   Gastrointestinal: Negative for abdominal pain, constipation, diarrhea, nausea and vomiting.   Genitourinary: Negative for dysuria, frequency and urgency.   Musculoskeletal: Negative for back pain, joint pain and myalgias.   Skin: Negative for itching and rash.   Neurological: Negative for dizziness, tremors, focal weakness and headaches.   Psychiatric/Behavioral: Negative for depression. The patient does not have insomnia.         Physical Exam  Temp:  [36.7 °C (98 °F)-36.9 °C (98.5 °F)] 36.9 °C (98.5 °F)  Pulse:  [63-89] 63  Resp:  [17-18] 17  BP: (115-140)/(62-94) 125/62  SpO2:  [90 %-96 %] 96 %    Physical Exam  Vitals signs and nursing note reviewed.   Constitutional:       Appearance: He is not ill-appearing or diaphoretic.   HENT:      Nose: No congestion.      Mouth/Throat:      Mouth: Mucous membranes are moist.      Pharynx: Oropharynx is " clear. No oropharyngeal exudate.   Eyes:      General: Scleral icterus present.      Conjunctiva/sclera: Conjunctivae normal.      Pupils: Pupils are equal, round, and reactive to light.   Cardiovascular:      Rate and Rhythm: Normal rate and regular rhythm.      Heart sounds: Normal heart sounds. No murmur.   Pulmonary:      Effort: Pulmonary effort is normal. No respiratory distress.      Breath sounds: No wheezing.   Abdominal:      General: Bowel sounds are normal. There is no distension.      Palpations: Abdomen is soft. There is no mass.      Tenderness: There is no abdominal tenderness.   Skin:     General: Skin is warm and dry.      Capillary Refill: Capillary refill takes less than 2 seconds.      Coloration: Skin is jaundiced.      Findings: No bruising.   Neurological:      General: No focal deficit present.      Mental Status: He is alert.      Motor: No weakness.   Psychiatric:         Mood and Affect: Mood normal.         Thought Content: Thought content normal.         Fluids    Intake/Output Summary (Last 24 hours) at 2/23/2020 1237  Last data filed at 2/23/2020 0800  Gross per 24 hour   Intake 2116 ml   Output 400 ml   Net 1716 ml       Laboratory  Recent Labs     02/22/20  1659 02/23/20  0414   WBC 6.2 5.1   RBC 5.65 4.53*   HEMOGLOBIN 18.1* 14.6   HEMATOCRIT 53.8* 43.5   MCV 95.2 96.0   MCH 32.0 32.2   MCHC 33.6* 33.6*   RDW 45.4 46.3   PLATELETCT 176 150*   MPV 9.3 9.4     Recent Labs     02/22/20  1659 02/23/20  0414   SODIUM 140 141   POTASSIUM 3.7 3.7   CHLORIDE 101 106   CO2 26 22   GLUCOSE 116* 138*   BUN 25* 29*   CREATININE 1.48* 1.21   CALCIUM 10.1 8.6                   Imaging  [id number]/O   Final Result      1.  Cholelithiasis and probable choledocholithiasis with mild extrahepatic biliary dilatation.   2.  5 mm T2 hyperintense uncinate lesion, suspicious for IPMN or pancreatic cyst. A small mass could also have this appearance.      US-RUQ   Final Result      1.  Sludge within the  gallbladder. No gallstones seen. No biliary ductal dilatation.      2.  The liver is echogenic consistent with fatty change versus hepatocellular dysfunction.           Assessment/Plan  Choledocholithiasis- (present on admission)  Assessment & Plan  With abnormal LFT  MRCP reviewed by myself with Dr. Sher from radiology and stones are seen in the common duct  I did call Dr. Turcios [facility] as the patient had a colonoscopy with Dr. Barnett in the past  No cholecystitis  Surgery Dr. Morocho also consulted for cholecystectomy after ERCP    GERD (gastroesophageal reflux disease)- (present on admission)  Assessment & Plan  Continue PPI         VTE prophylaxis: ambulatory

## 2020-02-23 NOTE — PROGRESS NOTES
Med rec updated and complete  Allergies reviewed  Interviewed pt with wife at bedside with permission from pt.  Pt reports no antibiotics in the last 2 weeks  Pt reports no prescription medications.

## 2020-02-24 ENCOUNTER — ANESTHESIA (OUTPATIENT)
Dept: SURGERY | Facility: MEDICAL CENTER | Age: 70
DRG: 419 | End: 2020-02-24
Payer: MEDICARE

## 2020-02-24 ENCOUNTER — ANESTHESIA EVENT (OUTPATIENT)
Dept: SURGERY | Facility: MEDICAL CENTER | Age: 70
DRG: 419 | End: 2020-02-24
Payer: MEDICARE

## 2020-02-24 LAB
ALBUMIN SERPL BCP-MCNC: 3.2 G/DL (ref 3.2–4.9)
ALBUMIN/GLOB SERPL: 1.1 G/DL
ALP SERPL-CCNC: 119 U/L (ref 30–99)
ALT SERPL-CCNC: 351 U/L (ref 2–50)
ANION GAP SERPL CALC-SCNC: 8 MMOL/L (ref 7–16)
AST SERPL-CCNC: 91 U/L (ref 12–45)
BILIRUB SERPL-MCNC: 1.4 MG/DL (ref 0.1–1.5)
BUN SERPL-MCNC: 23 MG/DL (ref 8–22)
CALCIUM SERPL-MCNC: 8.7 MG/DL (ref 8.4–10.2)
CHLORIDE SERPL-SCNC: 110 MMOL/L (ref 96–112)
CO2 SERPL-SCNC: 22 MMOL/L (ref 20–33)
CREAT SERPL-MCNC: 1.22 MG/DL (ref 0.5–1.4)
ERYTHROCYTE [DISTWIDTH] IN BLOOD BY AUTOMATED COUNT: 45.5 FL (ref 35.9–50)
GLOBULIN SER CALC-MCNC: 3 G/DL (ref 1.9–3.5)
GLUCOSE SERPL-MCNC: 91 MG/DL (ref 65–99)
HCT VFR BLD AUTO: 42.8 % (ref 42–52)
HGB BLD-MCNC: 14.2 G/DL (ref 14–18)
INR PPP: 0.92 (ref 0.87–1.13)
MCH RBC QN AUTO: 32.3 PG (ref 27–33)
MCHC RBC AUTO-ENTMCNC: 33.2 G/DL (ref 33.7–35.3)
MCV RBC AUTO: 97.5 FL (ref 81.4–97.8)
PATHOLOGY CONSULT NOTE: NORMAL
PLATELET # BLD AUTO: 147 K/UL (ref 164–446)
PMV BLD AUTO: 9.3 FL (ref 9–12.9)
POTASSIUM SERPL-SCNC: 4.2 MMOL/L (ref 3.6–5.5)
PROT SERPL-MCNC: 6.2 G/DL (ref 6–8.2)
PROTHROMBIN TIME: 12.4 SEC (ref 12–14.6)
RBC # BLD AUTO: 4.39 M/UL (ref 4.7–6.1)
SODIUM SERPL-SCNC: 140 MMOL/L (ref 135–145)
WBC # BLD AUTO: 4.7 K/UL (ref 4.8–10.8)

## 2020-02-24 PROCEDURE — 700111 HCHG RX REV CODE 636 W/ 250 OVERRIDE (IP): Performed by: ANESTHESIOLOGY

## 2020-02-24 PROCEDURE — 700105 HCHG RX REV CODE 258: Performed by: SURGERY

## 2020-02-24 PROCEDURE — 36415 COLL VENOUS BLD VENIPUNCTURE: CPT

## 2020-02-24 PROCEDURE — 160048 HCHG OR STATISTICAL LEVEL 1-5: Performed by: SURGERY

## 2020-02-24 PROCEDURE — 99232 SBSQ HOSP IP/OBS MODERATE 35: CPT | Performed by: INTERNAL MEDICINE

## 2020-02-24 PROCEDURE — 700111 HCHG RX REV CODE 636 W/ 250 OVERRIDE (IP): Performed by: INTERNAL MEDICINE

## 2020-02-24 PROCEDURE — 700111 HCHG RX REV CODE 636 W/ 250 OVERRIDE (IP): Performed by: SURGERY

## 2020-02-24 PROCEDURE — 88304 TISSUE EXAM BY PATHOLOGIST: CPT

## 2020-02-24 PROCEDURE — 502571 HCHG PACK, LAP CHOLE: Performed by: SURGERY

## 2020-02-24 PROCEDURE — A6402 STERILE GAUZE <= 16 SQ IN: HCPCS | Performed by: SURGERY

## 2020-02-24 PROCEDURE — 501582 HCHG TROCAR, THRD BLADED: Performed by: SURGERY

## 2020-02-24 PROCEDURE — 501583 HCHG TROCAR, THRD CAN&SEAL 5X100: Performed by: SURGERY

## 2020-02-24 PROCEDURE — 160002 HCHG RECOVERY MINUTES (STAT): Performed by: SURGERY

## 2020-02-24 PROCEDURE — 501572 HCHG TROCAR, SHIELD OBTU 5X100: Performed by: SURGERY

## 2020-02-24 PROCEDURE — A9270 NON-COVERED ITEM OR SERVICE: HCPCS | Performed by: INTERNAL MEDICINE

## 2020-02-24 PROCEDURE — 501838 HCHG SUTURE GENERAL: Performed by: SURGERY

## 2020-02-24 PROCEDURE — 500514 HCHG ENDOCLIP: Performed by: SURGERY

## 2020-02-24 PROCEDURE — 85027 COMPLETE CBC AUTOMATED: CPT

## 2020-02-24 PROCEDURE — 85610 PROTHROMBIN TIME: CPT

## 2020-02-24 PROCEDURE — 700101 HCHG RX REV CODE 250: Performed by: ANESTHESIOLOGY

## 2020-02-24 PROCEDURE — 700101 HCHG RX REV CODE 250: Performed by: INTERNAL MEDICINE

## 2020-02-24 PROCEDURE — 160028 HCHG SURGERY MINUTES - 1ST 30 MINS LEVEL 3: Performed by: SURGERY

## 2020-02-24 PROCEDURE — 160035 HCHG PACU - 1ST 60 MINS PHASE I: Performed by: SURGERY

## 2020-02-24 PROCEDURE — 0FT44ZZ RESECTION OF GALLBLADDER, PERCUTANEOUS ENDOSCOPIC APPROACH: ICD-10-PCS | Performed by: SURGERY

## 2020-02-24 PROCEDURE — 770006 HCHG ROOM/CARE - MED/SURG/GYN SEMI*

## 2020-02-24 PROCEDURE — 80053 COMPREHEN METABOLIC PANEL: CPT

## 2020-02-24 PROCEDURE — 500800 HCHG LAPAROSCOPIC J/L HOOK: Performed by: SURGERY

## 2020-02-24 PROCEDURE — 700102 HCHG RX REV CODE 250 W/ 637 OVERRIDE(OP): Performed by: INTERNAL MEDICINE

## 2020-02-24 PROCEDURE — 160009 HCHG ANES TIME/MIN: Performed by: SURGERY

## 2020-02-24 RX ORDER — LIDOCAINE HYDROCHLORIDE 20 MG/ML
INJECTION, SOLUTION EPIDURAL; INFILTRATION; INTRACAUDAL; PERINEURAL PRN
Status: DISCONTINUED | OUTPATIENT
Start: 2020-02-24 | End: 2020-02-24 | Stop reason: SURG

## 2020-02-24 RX ORDER — SODIUM CHLORIDE, SODIUM LACTATE, POTASSIUM CHLORIDE, CALCIUM CHLORIDE 600; 310; 30; 20 MG/100ML; MG/100ML; MG/100ML; MG/100ML
INJECTION, SOLUTION INTRAVENOUS CONTINUOUS
Status: DISCONTINUED | OUTPATIENT
Start: 2020-02-24 | End: 2020-02-24

## 2020-02-24 RX ORDER — HYDROMORPHONE HYDROCHLORIDE 1 MG/ML
0.2 INJECTION, SOLUTION INTRAMUSCULAR; INTRAVENOUS; SUBCUTANEOUS
Status: DISCONTINUED | OUTPATIENT
Start: 2020-02-24 | End: 2020-02-24 | Stop reason: HOSPADM

## 2020-02-24 RX ORDER — ROCURONIUM BROMIDE 10 MG/ML
INJECTION, SOLUTION INTRAVENOUS PRN
Status: DISCONTINUED | OUTPATIENT
Start: 2020-02-24 | End: 2020-02-24 | Stop reason: SURG

## 2020-02-24 RX ORDER — LABETALOL HYDROCHLORIDE 5 MG/ML
5 INJECTION, SOLUTION INTRAVENOUS
Status: DISCONTINUED | OUTPATIENT
Start: 2020-02-24 | End: 2020-02-24 | Stop reason: HOSPADM

## 2020-02-24 RX ORDER — OXYCODONE HYDROCHLORIDE 10 MG/1
10 TABLET ORAL
Status: DISCONTINUED | OUTPATIENT
Start: 2020-02-24 | End: 2020-02-25 | Stop reason: HOSPADM

## 2020-02-24 RX ORDER — HALOPERIDOL 5 MG/ML
1 INJECTION INTRAMUSCULAR
Status: DISCONTINUED | OUTPATIENT
Start: 2020-02-24 | End: 2020-02-24 | Stop reason: HOSPADM

## 2020-02-24 RX ORDER — HYDROMORPHONE HYDROCHLORIDE 1 MG/ML
0.4 INJECTION, SOLUTION INTRAMUSCULAR; INTRAVENOUS; SUBCUTANEOUS
Status: DISCONTINUED | OUTPATIENT
Start: 2020-02-24 | End: 2020-02-24 | Stop reason: HOSPADM

## 2020-02-24 RX ORDER — HYDROMORPHONE HYDROCHLORIDE 2 MG/ML
INJECTION, SOLUTION INTRAMUSCULAR; INTRAVENOUS; SUBCUTANEOUS PRN
Status: DISCONTINUED | OUTPATIENT
Start: 2020-02-24 | End: 2020-02-24 | Stop reason: SURG

## 2020-02-24 RX ORDER — ACETAMINOPHEN 500 MG
1000 TABLET ORAL EVERY 6 HOURS
Status: DISCONTINUED | OUTPATIENT
Start: 2020-02-25 | End: 2020-02-25 | Stop reason: HOSPADM

## 2020-02-24 RX ORDER — MORPHINE SULFATE 4 MG/ML
4 INJECTION, SOLUTION INTRAMUSCULAR; INTRAVENOUS
Status: DISCONTINUED | OUTPATIENT
Start: 2020-02-24 | End: 2020-02-25 | Stop reason: HOSPADM

## 2020-02-24 RX ORDER — SODIUM CHLORIDE, SODIUM LACTATE, POTASSIUM CHLORIDE, CALCIUM CHLORIDE 600; 310; 30; 20 MG/100ML; MG/100ML; MG/100ML; MG/100ML
INJECTION, SOLUTION INTRAVENOUS CONTINUOUS
Status: DISCONTINUED | OUTPATIENT
Start: 2020-02-24 | End: 2020-02-24 | Stop reason: HOSPADM

## 2020-02-24 RX ORDER — CEFAZOLIN SODIUM 1 G/3ML
INJECTION, POWDER, FOR SOLUTION INTRAMUSCULAR; INTRAVENOUS PRN
Status: DISCONTINUED | OUTPATIENT
Start: 2020-02-24 | End: 2020-02-24 | Stop reason: SURG

## 2020-02-24 RX ORDER — BUPIVACAINE HYDROCHLORIDE 2.5 MG/ML
INJECTION, SOLUTION EPIDURAL; INFILTRATION; INTRACAUDAL
Status: DISCONTINUED | OUTPATIENT
Start: 2020-02-24 | End: 2020-02-24 | Stop reason: HOSPADM

## 2020-02-24 RX ORDER — HYDRALAZINE HYDROCHLORIDE 20 MG/ML
5 INJECTION INTRAMUSCULAR; INTRAVENOUS
Status: DISCONTINUED | OUTPATIENT
Start: 2020-02-24 | End: 2020-02-24 | Stop reason: HOSPADM

## 2020-02-24 RX ORDER — OXYCODONE HYDROCHLORIDE 5 MG/1
5 TABLET ORAL
Status: DISCONTINUED | OUTPATIENT
Start: 2020-02-24 | End: 2020-02-25 | Stop reason: HOSPADM

## 2020-02-24 RX ORDER — HYDROMORPHONE HYDROCHLORIDE 1 MG/ML
0.1 INJECTION, SOLUTION INTRAMUSCULAR; INTRAVENOUS; SUBCUTANEOUS
Status: DISCONTINUED | OUTPATIENT
Start: 2020-02-24 | End: 2020-02-24 | Stop reason: HOSPADM

## 2020-02-24 RX ORDER — KETOROLAC TROMETHAMINE 30 MG/ML
INJECTION, SOLUTION INTRAMUSCULAR; INTRAVENOUS PRN
Status: DISCONTINUED | OUTPATIENT
Start: 2020-02-24 | End: 2020-02-24 | Stop reason: SURG

## 2020-02-24 RX ORDER — DIPHENHYDRAMINE HYDROCHLORIDE 50 MG/ML
12.5 INJECTION INTRAMUSCULAR; INTRAVENOUS
Status: DISCONTINUED | OUTPATIENT
Start: 2020-02-24 | End: 2020-02-24 | Stop reason: HOSPADM

## 2020-02-24 RX ORDER — ONDANSETRON 2 MG/ML
INJECTION INTRAMUSCULAR; INTRAVENOUS PRN
Status: DISCONTINUED | OUTPATIENT
Start: 2020-02-24 | End: 2020-02-24 | Stop reason: SURG

## 2020-02-24 RX ORDER — OXYCODONE HCL 5 MG/5 ML
5 SOLUTION, ORAL ORAL
Status: DISCONTINUED | OUTPATIENT
Start: 2020-02-24 | End: 2020-02-24 | Stop reason: HOSPADM

## 2020-02-24 RX ORDER — ONDANSETRON 2 MG/ML
4 INJECTION INTRAMUSCULAR; INTRAVENOUS
Status: DISCONTINUED | OUTPATIENT
Start: 2020-02-24 | End: 2020-02-24 | Stop reason: HOSPADM

## 2020-02-24 RX ORDER — DEXAMETHASONE SODIUM PHOSPHATE 4 MG/ML
INJECTION, SOLUTION INTRA-ARTICULAR; INTRALESIONAL; INTRAMUSCULAR; INTRAVENOUS; SOFT TISSUE PRN
Status: DISCONTINUED | OUTPATIENT
Start: 2020-02-24 | End: 2020-02-24 | Stop reason: SURG

## 2020-02-24 RX ORDER — KETOROLAC TROMETHAMINE 30 MG/ML
15 INJECTION, SOLUTION INTRAMUSCULAR; INTRAVENOUS EVERY 6 HOURS
Status: DISCONTINUED | OUTPATIENT
Start: 2020-02-24 | End: 2020-02-25 | Stop reason: HOSPADM

## 2020-02-24 RX ORDER — MEPERIDINE HYDROCHLORIDE 25 MG/ML
6.25 INJECTION INTRAMUSCULAR; INTRAVENOUS; SUBCUTANEOUS
Status: DISCONTINUED | OUTPATIENT
Start: 2020-02-24 | End: 2020-02-24 | Stop reason: HOSPADM

## 2020-02-24 RX ORDER — OXYCODONE HCL 5 MG/5 ML
10 SOLUTION, ORAL ORAL
Status: DISCONTINUED | OUTPATIENT
Start: 2020-02-24 | End: 2020-02-24 | Stop reason: HOSPADM

## 2020-02-24 RX ORDER — MIDAZOLAM HYDROCHLORIDE 1 MG/ML
1 INJECTION INTRAMUSCULAR; INTRAVENOUS
Status: DISCONTINUED | OUTPATIENT
Start: 2020-02-24 | End: 2020-02-24 | Stop reason: HOSPADM

## 2020-02-24 RX ADMIN — SUGAMMADEX 200 MG: 100 INJECTION, SOLUTION INTRAVENOUS at 17:46

## 2020-02-24 RX ADMIN — DEXAMETHASONE SODIUM PHOSPHATE 8 MG: 4 INJECTION, SOLUTION INTRAMUSCULAR; INTRAVENOUS at 17:27

## 2020-02-24 RX ADMIN — ACETAMINOPHEN 1000 MG: 500 TABLET, FILM COATED ORAL at 22:16

## 2020-02-24 RX ADMIN — PROPOFOL 150 MG: 10 INJECTION, EMULSION INTRAVENOUS at 17:27

## 2020-02-24 RX ADMIN — ROCURONIUM BROMIDE 50 MG: 10 INJECTION, SOLUTION INTRAVENOUS at 17:27

## 2020-02-24 RX ADMIN — POTASSIUM CHLORIDE AND SODIUM CHLORIDE: 900; 150 INJECTION, SOLUTION INTRAVENOUS at 09:53

## 2020-02-24 RX ADMIN — LIDOCAINE HYDROCHLORIDE 5 ML: 20 INJECTION, SOLUTION EPIDURAL; INFILTRATION; INTRACAUDAL; PERINEURAL at 17:27

## 2020-02-24 RX ADMIN — KETOROLAC TROMETHAMINE 30 MG: 30 INJECTION, SOLUTION INTRAMUSCULAR at 17:46

## 2020-02-24 RX ADMIN — CEFAZOLIN 2 G: 1 INJECTION, POWDER, FOR SOLUTION INTRAVENOUS at 17:29

## 2020-02-24 RX ADMIN — KETOROLAC TROMETHAMINE 15 MG: 30 INJECTION, SOLUTION INTRAMUSCULAR at 22:15

## 2020-02-24 RX ADMIN — ONDANSETRON 4 MG: 2 INJECTION INTRAMUSCULAR; INTRAVENOUS at 17:27

## 2020-02-24 RX ADMIN — HYDROMORPHONE HYDROCHLORIDE 0.6 MG: 2 INJECTION, SOLUTION INTRAMUSCULAR; INTRAVENOUS; SUBCUTANEOUS at 17:35

## 2020-02-24 RX ADMIN — SODIUM CHLORIDE, POTASSIUM CHLORIDE, SODIUM LACTATE AND CALCIUM CHLORIDE: 600; 310; 30; 20 INJECTION, SOLUTION INTRAVENOUS at 17:22

## 2020-02-24 ASSESSMENT — ENCOUNTER SYMPTOMS
DIARRHEA: 0
SINUS PAIN: 0
VOMITING: 0
ABDOMINAL PAIN: 0
INSOMNIA: 0
NAUSEA: 0
TREMORS: 0
MYALGIAS: 0
SHORTNESS OF BREATH: 0
FEVER: 0
FOCAL WEAKNESS: 0
SORE THROAT: 0
DEPRESSION: 0
CHILLS: 0
HEARTBURN: 0
COUGH: 0
DIZZINESS: 0
BLOOD IN STOOL: 0
DIAPHORESIS: 0
BACK PAIN: 0
CONSTIPATION: 0

## 2020-02-24 ASSESSMENT — PAIN SCALES - GENERAL: PAIN_LEVEL: 2

## 2020-02-24 NOTE — PROGRESS NOTES
"Hospital Medicine Daily Progress Note    Date of Service  2/24/2020    Chief Complaint  69 y.o. male admitted 2/22/2020 with abdominal pain    Hospital Course    This is a 69 y.o. male who presented 2/22/2020 with brown urine so he was seen in urgent care and subsequently sent here.  Patient states he had abdominal pain, Tuesday and Wednesday he had epigastric aching 4 out of 10.  He had some nausea and one episode of emesis.  His stool was \"blonde\", his skin was yellow and urine was dark. His pain resolved and urine turned lighter in color but MRCP showed common bile duct stones.        Interval Problem Update  The patient ate last night with no pain  He is npo this morning    Consultants/Specialty  GI Dr. Turcios  Surgery Dr. Morocho    Code Status  full    Disposition  home    Review of Systems  Review of Systems   Constitutional: Negative for diaphoresis and fever.   HENT: Negative for sinus pain and sore throat.    Respiratory: Negative for cough and shortness of breath.    Cardiovascular: Negative for chest pain and leg swelling.   Gastrointestinal: Negative for abdominal pain, constipation, heartburn and nausea.   Genitourinary: Negative for dysuria and frequency.   Musculoskeletal: Negative for back pain, joint pain and myalgias.   Skin: Negative for itching and rash.   Neurological: Negative for dizziness, tremors and focal weakness.   Psychiatric/Behavioral: Negative for depression. The patient does not have insomnia.         Physical Exam  Temp:  [36.7 °C (98 °F)-36.9 °C (98.5 °F)] 36.7 °C (98 °F)  Pulse:  [58-65] 65  Resp:  [17-18] 18  BP: (111-153)/(67-80) 111/67  SpO2:  [95 %-96 %] 96 %    Physical Exam  Vitals signs and nursing note reviewed.   Constitutional:       Appearance: He is not diaphoretic.   HENT:      Nose: No congestion.      Mouth/Throat:      Mouth: Mucous membranes are moist.      Pharynx: Oropharynx is clear. No oropharyngeal exudate.   Eyes:      General: Scleral icterus present.      " Pupils: Pupils are equal, round, and reactive to light.   Neck:      Musculoskeletal: Neck supple. No neck rigidity.   Cardiovascular:      Rate and Rhythm: Normal rate.      Heart sounds: Normal heart sounds. No murmur. No friction rub.   Pulmonary:      Effort: Pulmonary effort is normal. No respiratory distress.      Breath sounds: No wheezing.   Abdominal:      General: Bowel sounds are normal. There is no distension.      Palpations: Abdomen is soft. There is no mass.      Tenderness: There is no abdominal tenderness.   Skin:     General: Skin is warm and dry.      Capillary Refill: Capillary refill takes less than 2 seconds.      Coloration: Skin is jaundiced.      Findings: No bruising.      Comments: Jaundice improving   Neurological:      General: No focal deficit present.      Mental Status: He is alert.      Motor: No weakness.   Psychiatric:         Mood and Affect: Mood normal.         Thought Content: Thought content normal.         Fluids    Intake/Output Summary (Last 24 hours) at 2/24/2020 1055  Last data filed at 2/23/2020 2000  Gross per 24 hour   Intake 600 ml   Output --   Net 600 ml       Laboratory  Recent Labs     02/22/20  1659 02/23/20  0414 02/24/20  0403   WBC 6.2 5.1 4.7*   RBC 5.65 4.53* 4.39*   HEMOGLOBIN 18.1* 14.6 14.2   HEMATOCRIT 53.8* 43.5 42.8   MCV 95.2 96.0 97.5   MCH 32.0 32.2 32.3   MCHC 33.6* 33.6* 33.2*   RDW 45.4 46.3 45.5   PLATELETCT 176 150* 147*   MPV 9.3 9.4 9.3     Recent Labs     02/22/20  1659 02/23/20  0414 02/24/20  0403   SODIUM 140 141 140   POTASSIUM 3.7 3.7 4.2   CHLORIDE 101 106 110   CO2 26 22 22   GLUCOSE 116* 138* 91   BUN 25* 29* 23*   CREATININE 1.48* 1.21 1.22   CALCIUM 10.1 8.6 8.7     Recent Labs     02/24/20  0403   INR 0.92               Imaging  [id number]   Final Result      1.  Cholelithiasis and probable choledocholithiasis with mild extrahepatic biliary dilatation.   2.  5 mm T2 hyperintense uncinate lesion, suspicious for IPMN or  pancreatic cyst. A small mass could also have this appearance.      US-RUQ   Final Result      1.  Sludge within the gallbladder. No gallstones seen. No biliary ductal dilatation.      2.  The liver is echogenic consistent with fatty change versus hepatocellular dysfunction.           Assessment/Plan  Choledocholithiasis- (present on admission)  Assessment & Plan  With abnormal LFT  MRCP reviewed by myself with Dr. Sher 2/23 and stones are seen in the common duct  GI and surgery are consulted and procedure planned for today  Will order repeat labs    GERD (gastroesophageal reflux disease)- (present on admission)  Assessment & Plan  Continue PPI       VTE prophylaxis: ambulatory

## 2020-02-24 NOTE — DIETARY
Nutrition Services:    MST of 2 on Nutrition Admit Screen. Pt is currently NPO. Pt reports weight has been stable and PO intake has been fine PTA considering he has not felt well. Pt went over the foods that he ate PTA with this RD. Ht: 175.3 cm, Wt: 27.35 kg, BMI 27.35.  Consult RD as needed. RD will re-screen weekly.      RD available prn

## 2020-02-24 NOTE — ANESTHESIA PREPROCEDURE EVALUATION
69yoM with hx GERD presenting for lap chris    Allergies to statins  NPO  No AC    Relevant Problems   GI   (+) GERD (gastroesophageal reflux disease)       Physical Exam    Airway   Mallampati: II       Cardiovascular - normal exam     Dental - normal exam         Pulmonary - normal exam     Abdominal - normal exam     Neurological - normal exam                 Anesthesia Plan    ASA 2       Plan - general       Airway plan will be ETT        Induction: intravenous    Postoperative Plan: Postoperative administration of opioids is intended.    Pertinent diagnostic labs and testing reviewed    Informed Consent:    Anesthetic plan and risks discussed with patient.

## 2020-02-24 NOTE — CARE PLAN
A+O x 4  Denies abdominal pain  Denies n/v/d  Bm yesterday  Iv fluids a/o  Maintained npo   Surgery scheduled for 1700  Patient updated  No needs at this time, [facility]    Problem: Safety  Goal: Will remain free from injury  Outcome: PROGRESSING AS EXPECTED     Problem: Pain Management  Goal: Pain level will decrease to patient's comfort goal  Outcome: PROGRESSING AS EXPECTED    Fall precautions/hourly rounding maintained, call light within reach and functioning, all items within reach.  Patient encouraged to call for assistance, poc reviewed with patient, ?'s/concerns answered.

## 2020-02-24 NOTE — PROGRESS NOTES
Received report from day shift nurse.   Assumed pt care   Pt is A&Ox4, resting comfortably in bed.   Pt on r.a.. No signs of SOB/respiratory distress.   Labs noted, VSS.   Pt denied pain, denied nausea/vomiting  Pt being aware of NPO status at midnight for possible surgery in AM  Fall precautions in place.   Bed locked & at lowest position.   Call light and personal belongings within reach.   Continue to monitor

## 2020-02-24 NOTE — PROGRESS NOTES
No change from morning assessment except tolerating regular food without pain.  Plan of care reviewed again and questions answered as needed.

## 2020-02-24 NOTE — CONSULTS
Date of Consultation:  2/23/2020    Patient: : Marquez Crawford  MRN: [id number]    Referring Physician: Dr. Fischer     GI:Winston Turcios M.D.     Reason for Consultation: Elevated liver enzyme possible choledocholithiasis    History of Present Illness:   Thank you for allowing me to consult this truly delightful 69-year-old male.  No significant past medical history.  History obtained from chart review discussion with patient as well as family member.  Patient reports onset of epigastric discomfort for mid last week with intermittent epigastric discomfort associated one episode nausea emesis.  Denies any sabi hematemesis hemoptysis.  Epigastric discomfort 4 out of 10 intensity.  Had subjective low-grade fever as well.  Reports stool slightly dark.  Denies any trauma.  Ultimately due to possible jaundice noticed 2 days ago presented to the urgent care for evaluation.  Was recommended to report to the ER.  Patient presented to hospital on February 22, 2020 ER evaluation demonstrated elevated LFTs.  Ultrasound and MRI was performed ultimately performed demonstrating suspected possible choledocholithiasis.  Patient reports since admission complete resolution of symptoms.  No epigastric discomfort.  Does report history of intermittent right upper quadrant pain which he suspect possibly due to passage of intermittent stones for the last few years.  Denies any family history of gallbladder issues or pancreatic issues.  No significant weight changes recently.      No past medical history on file.      History reviewed. No pertinent surgical history.    History reviewed. No pertinent family history.    Social History     Socioeconomic History   • Marital status:      Spouse name: Not on file   • Number of children: Not on file   • Years of education: Not on file   • Highest education level: Not on file   Occupational History   • Not on file   Social Needs   • Financial resource strain: Not on file   • Food  insecurity     Worry: Not on file     Inability: Not on file   • Transportation needs     Medical: Not on file     Non-medical: Not on file   Tobacco Use   • Smoking status: Never Smoker   • Smokeless tobacco: Never Used   Substance and Sexual Activity   • Alcohol use: Yes   • Drug use: Yes     Types: Marijuana   • Sexual activity: Yes     Partners: Female   Lifestyle   • Physical activity     Days per week: Not on file     Minutes per session: Not on file   • Stress: Not on file   Relationships   • Social connections     Talks on phone: Not on file     Gets together: Not on file     Attends Yazdanism service: Not on file     Active member of club or organization: Not on file     Attends meetings of clubs or organizations: Not on file     Relationship status: Not on file   • Intimate partner violence     Fear of current or ex partner: Not on file     Emotionally abused: Not on file     Physically abused: Not on file     Forced sexual activity: Not on file   Other Topics Concern   • Not on file   Social History Narrative   • Not on file       Review of Systems   Constitutional: Negative.    HENT: Negative.    Eyes: Negative.    Respiratory: Negative.    Cardiovascular: Negative.    Gastrointestinal: Negative.    Musculoskeletal: Negative.    Skin: Negative.    Neurological: Negative.    Endo/Heme/Allergies: Negative.    Psychiatric/Behavioral: Negative.          Physical Exam:  Vitals:    02/22/20 2210 02/22/20 2328 02/23/20 0400 02/23/20 1054   BP: 124/82 140/90 132/66 125/62   Pulse: 74 70 63 63   Resp:  18 18 17   Temp:  36.8 °C (98.3 °F) 36.8 °C (98.2 °F) 36.9 °C (98.5 °F)   TempSrc:  Oral Oral Oral   SpO2: 94%  96% 96%   Weight:       Height:           Physical Exam   Constitutional: He is oriented to person, place, and time and well-developed, well-nourished, and in no distress. No distress.   HENT:   Head: Normocephalic and atraumatic.   Eyes: Pupils are equal, round, and reactive to light. Conjunctivae and  EOM are normal. Right eye exhibits no discharge. Left eye exhibits no discharge. No scleral icterus.   Neck: Normal range of motion. Neck supple. No JVD present. No tracheal deviation present. No thyromegaly present.   Cardiovascular: Normal rate and regular rhythm. Exam reveals no gallop and no friction rub.   No murmur heard.  Pulmonary/Chest: Effort normal and breath sounds normal. No stridor. No respiratory distress. He has no wheezes. He has no rales. He exhibits no tenderness.   Abdominal: Soft. Bowel sounds are normal. He exhibits no distension and no mass. There is no abdominal tenderness. There is no rebound and no guarding.   Musculoskeletal: Normal range of motion.   Lymphadenopathy:     He has no cervical adenopathy.   Neurological: He is alert and oriented to person, place, and time.   Skin: Skin is warm and dry. No rash noted. He is not diaphoretic. No erythema. No pallor.         Labs:  Recent Labs     02/22/20  1659 02/23/20  0414   WBC 6.2 5.1   RBC 5.65 4.53*   HEMOGLOBIN 18.1* 14.6   HEMATOCRIT 53.8* 43.5   MCV 95.2 96.0   MCH 32.0 32.2   MCHC 33.6* 33.6*   RDW 45.4 46.3   PLATELETCT 176 150*   MPV 9.3 9.4     Recent Labs     02/22/20  1659 02/23/20  0414   SODIUM 140 141   POTASSIUM 3.7 3.7   CHLORIDE 101 106   CO2 26 22   GLUCOSE 116* 138*   BUN 25* 29*             Imaging:  [id number]  Narrative: HISTORY/REASON FOR EXAM:  Abnormal LFTs, gallbladder sludge.    TECHNIQUE/EXAM DESCRIPTION: Magnetic resonance cholangiopancreatography.    Magnetic resonance cholangiopancreatography was performed on with axial, coronal, and sagittal thin and thick section heavily T2-weighted sequences.    3-D cholangiographic multiplanar maximum intensity projection (MIP) images were created on a separate workstation..    The study was performed on a [employer] 1.5 Gabriella MRI scanner.    COMPARISON: RIGHT upper quadrant ultrasound performed 2/22/2020    FINDINGS:  There is tiny segment 2 peripheral hepatic cyst.  The liver is otherwise normal in appearance.    The spleen is normal in appearance.    There is a 5 mm T2 hyperintense posterior uncinate area of increased fluid signal which appears to communicate with the ductal system. Pancreas is normal in appearance.    Gallbladder and biliary system: At least one gallstone is present in the region of the gallbladder neck. There is mild extrahepatic biliary dilatation to 8 mm. No intrahepatic biliary dilatation. There is an ovoid filling defect in the distal common duct   measuring 5 mm on coronal oblique T2 image 11 series 12.    There is a subcentimeter LEFT lower pole renal cyst. The adrenal glands and the kidneys are otherwise normal in appearance.    The visualized bowel and retroperitoneum are within normal limits  Impression: 1.  Cholelithiasis and probable choledocholithiasis with mild extrahepatic biliary dilatation.  2.  5 mm T2 hyperintense uncinate lesion, suspicious for IPMN or pancreatic cyst. A small mass could also have this appearance.            Impressions:  1. Elevated liver function testing  2. Possible choledocholithiasis   3. Possible cyst in the uncinate of pancreas 5mm  4. Gallstones    69-year-old male without any significant past medical history who presents to the hospital for epigastric discomfort associated with elevated LFTs and jaundice. Patient reports resolution of epigastric discomfort.  Overnight liver function also improved with dropping of total bilirubin from 4->1.8 AST from 247->149; ->446.  Lipase was 38.  MRI showed probable stone without definitive obvious stone noted.  Discussed with patient potentially for bowel and valve effect/reflux of stone upwards vs passage.  Cussed extensively with patient is patient's family potential for ERCP procedure versus cholecystectomy with IOC.  Discussed limitation of the procedure potential for ERCP pancreatitis.  Risk and benefit of patient leisure were discussed with patient in great  detail.  Dr. Morocho was present for part of the interview.      Recommendations:  1. repeat LFTs in the morning; if worsening recommend pre-cholecystectomy ERCP. will need to call in the morning again for surgical time  2. If LFTs continue to decrease recommend proceeding with laparoscopic cholecystectomy with intraoperative cholangiogram; based on result can also do postoperative ERCP as well  3. NPO post midnight  4. Labs in the morning  5. Clear liquid diet today ok  6. Monitor for possible pancreatic cyst as outpatient.       This note was generated using voice recognition software which has a small chance of producing errors of grammar and possibly content. I have made every reasonable attempt to find and correct any obvious errors, but expect that some may not be found prior to finalization of this note.

## 2020-02-24 NOTE — PROGRESS NOTES
Pt with cholelithiasis and ? Choledocholithiasis  LFTs coming down  Will recheck in AM  If down further, plan lap chris with IOC  If up, ERCP w Dr Turcios

## 2020-02-24 NOTE — PROGRESS NOTES
Gastroenterology Progress Note     Author: Armando Dumont M.D.   Date & Time Created: 2/24/2020 3:41 PM    Chief Complaint:  choledocholithiasis    HPI:  Patient reports onset of epigastric discomfort for mid last week with intermittent epigastric discomfort associated one episode nausea emesis.  Denies any sabi hematemesis hemoptysis.  Epigastric discomfort 4 out of 10 intensity.  Had subjective low-grade fever as well.  Reports stool slightly dark.  Denies any trauma.  Ultimately due to possible jaundice noticed 2 days ago presented to the urgent care for evaluation.  Was recommended to report to the ER.  Patient presented to hospital on February 22, 2020 ER evaluation demonstrated elevated LFTs.  Ultrasound and MRI was performed ultimately performed demonstrating suspected possible choledocholithiasis.  Patient reports since admission complete resolution of symptoms.  No epigastric discomfort.  Does report history of intermittent right upper quadrant pain which he suspect possibly due to passage of intermittent stones for the last few years.  Denies any family history of gallbladder issues or pancreatic issues.  No significant weight changes recently.       Interval History:  2/24/2020: Denies pain or fevers.  Plan for lap chris with possible IOC this evening with Dr. Morocho.  LFTs improving.    Review of Systems:  Review of Systems   Constitutional: Negative for chills and fever.   Respiratory: Negative for shortness of breath.    Cardiovascular: Negative for chest pain.   Gastrointestinal: Negative for abdominal pain, blood in stool, constipation, diarrhea, heartburn, melena, nausea and vomiting.       Physical Exam:  Physical Exam  Vitals signs and nursing note reviewed.   Constitutional:       Appearance: Normal appearance.   Eyes:      General: No scleral icterus.  Cardiovascular:      Rate and Rhythm: Normal rate and regular rhythm.      Heart sounds: No murmur.   Pulmonary:      Effort: Pulmonary  effort is normal. No respiratory distress.      Breath sounds: Normal breath sounds. No stridor. No wheezing, rhonchi or rales.   Abdominal:      General: Abdomen is flat. Bowel sounds are normal. There is no distension.      Palpations: Abdomen is soft. There is no mass.      Tenderness: There is no abdominal tenderness. There is no guarding or rebound.      Hernia: No hernia is present.   Neurological:      General: No focal deficit present.      Mental Status: He is alert and oriented to person, place, and time.   Psychiatric:         Mood and Affect: Mood normal.         Behavior: Behavior normal.         Labs:          Recent Labs     20  0403   SODIUM 140 141 140   POTASSIUM 3.7 3.7 4.2   CHLORIDE 101 106 110   CO2 26 22 22   BUN 25* 29* 23*   CREATININE 1.48* 1.21 1.22   CALCIUM 10.1 8.6 8.7     Recent Labs     20  0403   ALTSGPT 749* 446* 351*   ASTSGOT 247* 129* 91*   ALKPHOSPHAT 171* 125* 119*   TBILIRUBIN 4.0* 1.8* 1.4   LIPASE 38  --   --    GLUCOSE 116* 138* 91     Recent Labs     20  0403   RBC 5.65 4.53* 4.39*   HEMOGLOBIN 18.1* 14.6 14.2   HEMATOCRIT 53.8* 43.5 42.8   PLATELETCT 176 150* 147*   PROTHROMBTM  --   --  12.4   INR  --   --  0.92     Recent Labs     20  0403   WBC 6.2 5.1 4.7*   NEUTSPOLYS 73.30* 60.10  --    LYMPHOCYTES 16.00* 21.30*  --    MONOCYTES 9.50 15.80*  --    EOSINOPHILS 0.60 2.20  --    BASOPHILS 0.30 0.20  --    ASTSGOT 247* 129* 91*   ALTSGPT 749* 446* 351*   ALKPHOSPHAT 171* 125* 119*   TBILIRUBIN 4.0* 1.8* 1.4     Hemodynamics:  Temp (24hrs), Av.8 °C (98.3 °F), Min:36.7 °C (98 °F), Max:36.9 °C (98.5 °F)  Temperature: 36.8 °C (98.2 °F)  Pulse  Av.4  Min: 56  Max: 89   Blood Pressure : 144/82     Respiratory:    Respiration: 18, Pulse Oximetry: 94 %           Fluids:    Intake/Output Summary (Last 24 hours) at 2020  1541  Last data filed at 2/23/2020 2000  Gross per 24 hour   Intake 600 ml   Output no documentation   Net 600 ml        GI/Nutrition:  Orders Placed This Encounter   Procedures   • Diet NPO     Standing Status:   Standing     Number of Occurrences:   8     Order Specific Question:   Restrict to:     Answer:   Sips with Medications [3]     Medical Decision Making, by Problem:  Active Hospital Problems    Diagnosis   • Choledocholithiasis [K80.50]   • GERD (gastroesophageal reflux disease) [K21.9]     PROBLEMS:  1. Elevated liver function testing  2. Possible choledocholithiasis with question of passed stone given improving LFTs   3. Possible cyst in the uncinate of pancreas 5mm, likely benign  4. Gallstones    Plan:  1. Plan for lap chris with attempted IOC by Dr. Morocho this evening.  If IOC positive, then plan for ERCP.    Quality-Core Measures   Reviewed items::  Radiology images reviewed, Labs reviewed and Medications reviewed

## 2020-02-25 VITALS
DIASTOLIC BLOOD PRESSURE: 57 MMHG | RESPIRATION RATE: 16 BRPM | SYSTOLIC BLOOD PRESSURE: 103 MMHG | HEART RATE: 63 BPM | HEIGHT: 69 IN | TEMPERATURE: 98.3 F | WEIGHT: 185.19 LBS | BODY MASS INDEX: 27.43 KG/M2 | OXYGEN SATURATION: 94 %

## 2020-02-25 LAB
ALBUMIN SERPL BCP-MCNC: 3.6 G/DL (ref 3.2–4.9)
ALBUMIN/GLOB SERPL: 1.2 G/DL
ALP SERPL-CCNC: 164 U/L (ref 30–99)
ALT SERPL-CCNC: 317 U/L (ref 2–50)
ANION GAP SERPL CALC-SCNC: 12 MMOL/L (ref 7–16)
AST SERPL-CCNC: 100 U/L (ref 12–45)
BILIRUB SERPL-MCNC: 1.7 MG/DL (ref 0.1–1.5)
BUN SERPL-MCNC: 27 MG/DL (ref 8–22)
CALCIUM SERPL-MCNC: 8.9 MG/DL (ref 8.4–10.2)
CHLORIDE SERPL-SCNC: 105 MMOL/L (ref 96–112)
CO2 SERPL-SCNC: 20 MMOL/L (ref 20–33)
CREAT SERPL-MCNC: 1.25 MG/DL (ref 0.5–1.4)
GLOBULIN SER CALC-MCNC: 2.9 G/DL (ref 1.9–3.5)
GLUCOSE SERPL-MCNC: 195 MG/DL (ref 65–99)
POTASSIUM SERPL-SCNC: 4.8 MMOL/L (ref 3.6–5.5)
PROT SERPL-MCNC: 6.5 G/DL (ref 6–8.2)
SODIUM SERPL-SCNC: 137 MMOL/L (ref 135–145)

## 2020-02-25 PROCEDURE — 99239 HOSP IP/OBS DSCHRG MGMT >30: CPT | Performed by: INTERNAL MEDICINE

## 2020-02-25 PROCEDURE — 700102 HCHG RX REV CODE 250 W/ 637 OVERRIDE(OP): Performed by: INTERNAL MEDICINE

## 2020-02-25 PROCEDURE — 80053 COMPREHEN METABOLIC PANEL: CPT

## 2020-02-25 PROCEDURE — 700101 HCHG RX REV CODE 250: Performed by: INTERNAL MEDICINE

## 2020-02-25 PROCEDURE — 700111 HCHG RX REV CODE 636 W/ 250 OVERRIDE (IP): Performed by: INTERNAL MEDICINE

## 2020-02-25 PROCEDURE — 36415 COLL VENOUS BLD VENIPUNCTURE: CPT

## 2020-02-25 PROCEDURE — A9270 NON-COVERED ITEM OR SERVICE: HCPCS | Performed by: INTERNAL MEDICINE

## 2020-02-25 RX ORDER — ACETAMINOPHEN 500 MG
1000 TABLET ORAL EVERY 6 HOURS
Qty: 30 TAB | Refills: 0 | Status: SHIPPED | OUTPATIENT
Start: 2020-02-25 | End: 2021-06-28

## 2020-02-25 RX ORDER — OXYCODONE HYDROCHLORIDE 5 MG/1
5 TABLET ORAL EVERY 6 HOURS PRN
Qty: 30 TAB | Refills: 0 | Status: SHIPPED | OUTPATIENT
Start: 2020-02-25 | End: 2020-02-25

## 2020-02-25 RX ADMIN — POTASSIUM CHLORIDE AND SODIUM CHLORIDE: 900; 150 INJECTION, SOLUTION INTRAVENOUS at 04:26

## 2020-02-25 RX ADMIN — OMEPRAZOLE 20 MG: 20 CAPSULE, DELAYED RELEASE ORAL at 04:25

## 2020-02-25 RX ADMIN — ACETAMINOPHEN 1000 MG: 500 TABLET, FILM COATED ORAL at 04:25

## 2020-02-25 RX ADMIN — KETOROLAC TROMETHAMINE 15 MG: 30 INJECTION, SOLUTION INTRAMUSCULAR at 04:25

## 2020-02-25 ASSESSMENT — ENCOUNTER SYMPTOMS
HEARTBURN: 0
NAUSEA: 0
CONSTIPATION: 0
ABDOMINAL PAIN: 0
CHILLS: 0
VOMITING: 0
SHORTNESS OF BREATH: 0
DIARRHEA: 0
FEVER: 0
BLOOD IN STOOL: 0

## 2020-02-25 NOTE — PROGRESS NOTES
Gastroenterology Progress Note     Author: Armando Dumont M.D.   Date & Time Created: 2/25/2020 8:17 AM    Chief Complaint:  choledocholithiasis    HPI:  Patient reports onset of epigastric discomfort for mid last week with intermittent epigastric discomfort associated one episode nausea emesis.  Denies any sabi hematemesis hemoptysis.  Epigastric discomfort 4 out of 10 intensity.  Had subjective low-grade fever as well.  Reports stool slightly dark.  Denies any trauma.  Ultimately due to possible jaundice noticed 2 days ago presented to the urgent care for evaluation.  Was recommended to report to the ER.  Patient presented to hospital on February 22, 2020 ER evaluation demonstrated elevated LFTs.  Ultrasound and MRI was performed ultimately performed demonstrating suspected possible choledocholithiasis.  Patient reports since admission complete resolution of symptoms.  No epigastric discomfort.  Does report history of intermittent right upper quadrant pain which he suspect possibly due to passage of intermittent stones for the last few years.  Denies any family history of gallbladder issues or pancreatic issues.  No significant weight changes recently.       Interval History:  2/24/2020: Denies pain or fevers.  Plan for lap chris with possible IOC this evening with Dr. Morocho.  LFTs improving.  2/25/2020: Had lap chris but IOC could not be done.  LFTs still elevated but stable.  He wants to go home and denies any pain.    Review of Systems:  Review of Systems   Constitutional: Negative for chills and fever.   Respiratory: Negative for shortness of breath.    Cardiovascular: Negative for chest pain.   Gastrointestinal: Negative for abdominal pain, blood in stool, constipation, diarrhea, heartburn, melena, nausea and vomiting.       Physical Exam:  Physical Exam  Vitals signs and nursing note reviewed.   Constitutional:       Appearance: Normal appearance.   Eyes:      General: No scleral  icterus.  Cardiovascular:      Rate and Rhythm: Normal rate and regular rhythm.      Heart sounds: No murmur.   Pulmonary:      Effort: Pulmonary effort is normal. No respiratory distress.      Breath sounds: Normal breath sounds. No stridor. No wheezing, rhonchi or rales.   Abdominal:      General: Abdomen is flat. Bowel sounds are normal. There is no distension.      Palpations: Abdomen is soft. There is no mass.      Tenderness: There is no abdominal tenderness. There is no guarding or rebound.      Hernia: No hernia is present.   Neurological:      General: No focal deficit present.      Mental Status: He is alert and oriented to person, place, and time.   Psychiatric:         Mood and Affect: Mood normal.         Behavior: Behavior normal.         Labs:          Recent Labs     20   SODIUM 141 140 137   POTASSIUM 3.7 4.2 4.8   CHLORIDE 106 110 105   CO2    BUN 29* 23* 27*   CREATININE 1.21 1.22 1.25   CALCIUM 8.6 8.7 8.9     Recent Labs     20   ALTSGPT 749* 446* 351* 317*   ASTSGOT 247* 129* 91* 100*   ALKPHOSPHAT 171* 125* 119* 164*   TBILIRUBIN 4.0* 1.8* 1.4 1.7*   LIPASE 38  --   --   --    GLUCOSE 116* 138* 91 195*     Recent Labs     20   RBC 5.65 4.53* 4.39*   HEMOGLOBIN 18.1* 14.6 14.2   HEMATOCRIT 53.8* 43.5 42.8   PLATELETCT 176 150* 147*   PROTHROMBTM  --   --  12.4   INR  --   --  0.92     Recent Labs     20   WBC 6.2 5.1 4.7*  --    NEUTSPOLYS 73.30* 60.10  --   --    LYMPHOCYTES 16.00* 21.30*  --   --    MONOCYTES 9.50 15.80*  --   --    EOSINOPHILS 0.60 2.20  --   --    BASOPHILS 0.30 0.20  --   --    ASTSGOT 247* 129* 91* 100*   ALTSGPT 749* 446* 351* 317*   ALKPHOSPHAT 171* 125* 119* 164*   TBILIRUBIN 4.0* 1.8* 1.4 1.7*     Hemodynamics:  Temp (24hrs), Av.9 °C (98.4 °F), Min:36.8 °C (98.2  °F), Max:37.1 °C (98.8 °F)  Temperature: 36.8 °C (98.3 °F)  Pulse  Av.1  Min: 56  Max: 98   Blood Pressure : 103/57     Respiratory:    Respiration: 16, Pulse Oximetry: 94 %        RUL Breath Sounds: Clear, RML Breath Sounds: Clear, RLL Breath Sounds: Clear, NEHA Breath Sounds: Clear, LLL Breath Sounds: Clear  Fluids:    Intake/Output Summary (Last 24 hours) at 2020 1541  Last data filed at 2020  Gross per 24 hour   Intake 600 ml   Output no documentation   Net 600 ml        GI/Nutrition:  Orders Placed This Encounter   Procedures   • Diet Order Consistent Carbohydrate     Standing Status:   Standing     Number of Occurrences:   1     Order Specific Question:   Diet:     Answer:   Consistent Carbohydrate [4]     Medical Decision Making, by Problem:  Active Hospital Problems    Diagnosis   • Choledocholithiasis [K80.50]   • GERD (gastroesophageal reflux disease) [K21.9]     PROBLEMS:  1. Elevated liver function testing.  Still concern for possible CBD stone.  2. Possible choledocholithiasis with question of passed stone given improving LFTs   3. Possible cyst in the uncinate of pancreas 5mm, likely benign  4. Gallstones. S/p lap chris.  Unable to do IOC.    Plan:  1. Discussed concern that he could still possibly have CBD stone and may require ERCP.  He does not want to stay and wants to go home and will return if he develops pain.  Recommend he repeat liver tests in one week.  Discussed that if he develops pain or jaundice, he needs to call the office.  If he develops pain, jaundice, fevers, vomiting, needs to come to ED immediately.  He understands and has good insight and feel discharge is safe.    Quality-Core Measures   Reviewed items::  Radiology images reviewed, Labs reviewed and Medications reviewed

## 2020-02-25 NOTE — ANESTHESIA PROCEDURE NOTES
Airway  Date/Time: 2/24/2020 5:28 PM  Performed by: Jann Gifford M.D.  Authorized by: Jann Gifford M.D.     Location:  OR  Urgency:  Elective  Difficult Airway: No    Indications for Airway Management:  Anesthesia  Spontaneous Ventilation: absent    Sedation Level:  Deep  Preoxygenated: Yes    Patient Position:  Sniffing  Final Airway Type:  Endotracheal airway  Final Endotracheal Airway:  ETT  Cuffed: Yes    Technique Used for Successful ETT Placement:  Direct laryngoscopy  Insertion Site:  Oral  Blade Type:  Thomas  Laryngoscope Blade/Videolaryngoscope Blade Size:  2  ETT Size (mm):  7.5  Measured from:  Lips  ETT to Lips (cm):  21  Placement Verified by: capnometry    Cormack-Lehane Classification:  Grade I - full view of glottis  Number of Attempts at Approach:  1

## 2020-02-25 NOTE — PROGRESS NOTES
Educated patient in regards to calling for aid before ambulating on his own. Patient verbalized understanding.

## 2020-02-25 NOTE — DISCHARGE INSTRUCTIONS
Laparoscopic Cholecystectomy D/C instructions:     DIET: Upon discharge from the hospital you may resume your normal preoperative diet. Depending on how you are feeling and whether you have nausea or not, you may wish to stay with a bland diet for the first few days. However, you can advance this as quickly as you feel ready.     ACTIVITIES: After discharge from the hospital, you may resume full routine activities. However, there should be no heavy lifting (greater than 15 pounds) and no strenuous activities until after your follow-up visit. Otherwise, routine activities of daily living are acceptable.     DRIVING: You may drive whenever you are off pain medications and are able to perform the activities needed to drive, i.e. turning, bending, twisting, etc.     BATHING: You may get the wound wet at any time after leaving the hospital. You may shower, but do not submerge in a bath for at least a week. Dressings may come off after 48 hours.     BOWEL FUNCTION: A few patients, after this operation, will develop either frequent or loose stools after meals. This usually corrects itself after a few days, to a few weeks. If this occurs, do not worry; it is not unusual and will resolve. Much more common than loose stools, is constipation. The combination of pain medication and decreased activity level can cause constipation in otherwise normal patients. If you feel this is occurring, take a laxative (Milk of Magnesia, Ex-Lax, Senokot, etc.) until the problem has resolved.     PAIN MEDICATION: You will be given a prescription for pain medication at discharge. Please take these as directed. It is important to remember not to take medications on an empty stomach as this may cause nausea.     CALL IF YOU HAVE: (1) Fevers to more than 1010 F, (2) Unusual chest or leg pain, (3) Drainage or fluid from incision that may be foul smelling, increased tenderness or soreness at the wound or the wound edges are no longer together,  redness or swelling at the incision site. Please do not hesitate to call with any other questions.     APPOINTMENT: Contact our office at [phone] for a follow-up appointment in 1 to 2 weeks following your procedure.     If you have any additional questions, please do not hesitate to call the office.     Office address:   [address], [address] [place] [address]    Discharge Instructions    Discharged to home by car with relative. Discharged via walking, hospital escort: Yes.  Special equipment needed: Not Applicable    Be sure to schedule a follow-up appointment with your primary care doctor or any specialists as instructed.     Discharge Plan:   Influenza Vaccine Indication: Patient Refuses    I understand that a diet low in cholesterol, fat, and sodium is recommended for good health. Unless I have been given specific instructions below for another diet, I accept this instruction as my diet prescription.   Other diet: low fat    Special Instructions: None    · Is patient discharged on Warfarin / Coumadin?   No     Depression / Suicide Risk    As you are discharged from this [facility] facility, it is important to learn how to keep safe from harming yourself.    Recognize the warning signs:  · Abrupt changes in personality, positive or negative- including increase in energy   · Giving away possessions  · Change in eating patterns- significant weight changes-  positive or negative  · Change in sleeping patterns- unable to sleep or sleeping all the time   · Unwillingness or inability to communicate  · Depression  · Unusual sadness, discouragement and loneliness  · Talk of wanting to die  · Neglect of personal appearance   · Rebelliousness- reckless behavior  · Withdrawal from people/activities they love  · Confusion- inability to concentrate     If you or a loved one observes any of these behaviors or has concerns about self-harm, here's what you can do:  · Talk about it- your feelings and reasons for harming  yourself  · Remove any means that you might use to hurt yourself (examples: pills, rope, extension cords, firearm)  · Get professional help from the community (Mental Health, Substance Abuse, psychological counseling)  · Do not be alone:Call your Safe Contact- someone whom you trust who will be there for you.  · Call your local CRISIS HOTLINE 871-3725 or 055-646-1188  · Call your local Children's Mobile Crisis Response Team [place] (382) 715-9232 or www.Nopsec  · Call the toll free National Suicide Prevention Hotlines   · National Suicide Prevention Lifeline 233-952-NRRO (3433)  · National Hope Line Network 800-SUICIDE (426-0050)

## 2020-02-25 NOTE — ANESTHESIA TIME REPORT
Anesthesia Start and Stop Event Times     Date Time Event    2/24/2020 1657 Ready for Procedure     1722 Anesthesia Start     1756 Anesthesia Stop        Responsible Staff  02/24/20    Name Role Begin End    Jann Gifford M.D. Anesth 1722 1756        Preop Diagnosis (Free Text):  Pre-op Diagnosis     Choledocholithiasis        Preop Diagnosis (Codes):    Post op Diagnosis  Cholelithiasis      Premium Reason  B. 1st Call    Comments:

## 2020-02-25 NOTE — DISCHARGE SUMMARY
Discharge Summary    CHIEF COMPLAINT ON ADMISSION  Chief Complaint   Patient presents with   • Abdominal Pain   • N/V       Reason for Admission  Abnormal Labs     Admission Date  2/22/2020    CODE STATUS  Full    HPI & HOSPITAL COURSE  This is a 69 y.o. male with a history of GERD and hyperlipidemia admitted for dark urine, abdominal pain and nausea found to have transaminitis secondary to cholelithiasis general surgery was consulted and they performed a laparoscopic cholecystectomy on 2/24/2020 without complication.  One day following his procedure, his counts did not normalize.  It was recommended that he have GI consultation for possible ERCP to rule out and or diagnose and treat choledocholithiasis however, the patient declined.  He felt significantly improved and opted to follow-up with GI as an outpatient if this became an issue.  The risks of recurrent symptoms were extensively explained to him and he accepted understanding however wass quite anxious to discharge.       Therefore, he is discharged in good and stable condition to home with close outpatient follow-up.    The patient met 2-midnight criteria for an inpatient stay at the time of discharge.    Discharge Date  2/25/2020    FOLLOW UP ITEMS POST DISCHARGE  Follow-up with Dr. Morocho on 3/4/20 as scheduled  Follow-up with GI consultants for ERCP if any right upper quadrant discomfort recurs    DISCHARGE DIAGNOSES  Active Problems:    GERD (gastroesophageal reflux disease) POA: Yes    Choledocholithiasis POA: Yes  Resolved Problems:    * No resolved hospital problems. *      FOLLOW UP  Catherine Morocho M.D.  [address] [place] [address] [id number]  [address]  [place] [address]  [phone]    On 3/4/2020        MEDICATIONS ON DISCHARGE     Medication List      START taking these medications      Instructions   acetaminophen 500 MG Tabs  Commonly known as:  TYLENOL   Take 2 Tabs by mouth every 6 hours.  Dose:  1,000 mg        CONTINUE taking these medications      Instructions    aspirin 325 MG Tabs  Commonly known as:  ASA   Take 650 mg by mouth every 6 hours as needed for Inflammation.  Dose:  650 mg     fluticasone 50 MCG/ACT nasal spray  Commonly known as:  FLONASE   Spray 1 Spray in nose as needed. Indications: Signs and Symptoms of Nose Diseases  Dose:  1 Spray     multivitamin Tabs   Take 1 Tab by mouth every day.  Dose:  1 Tab     PREVACID PO   Take 1 Tab by mouth every day.  Dose:  1 Tab     SAW PALMETTO PO   Take 1 Cap by mouth every day.  Dose:  1 Cap     VITAMIN E PO   Take 1 Cap by mouth every day.  Dose:  1 Cap            Allergies  Allergies   Allergen Reactions   • Lipitor [Atorvastatin] Myalgia   • Statins [Hmg-Coa-R Inhibitors]      Blister and the severe joint problem       DIET  No orders of the defined types were placed in this encounter.      ACTIVITY  As tolerated.  Weight bearing as tolerated    CONSULTATIONS  Dr. Morocho - Surgery    PROCEDURES  2/24: Brett perez, no complication    LABORATORY  Lab Results   Component Value Date    SODIUM 137 02/25/2020    POTASSIUM 4.8 02/25/2020    CHLORIDE 105 02/25/2020    CO2 20 02/25/2020    GLUCOSE 195 (H) 02/25/2020    BUN 27 (H) 02/25/2020    CREATININE 1.25 02/25/2020        Lab Results   Component Value Date    WBC 4.7 (L) 02/24/2020    HEMOGLOBIN 14.2 02/24/2020    HEMATOCRIT 42.8 02/24/2020    PLATELETCT 147 (L) 02/24/2020        Total time of the discharge process exceeds 40 minutes.

## 2020-02-25 NOTE — PROGRESS NOTES
Patient discharged today - patient agreeable to plan. D/c instructions reviewed with patient - ?'s/concerns answered.

## 2020-02-25 NOTE — PROGRESS NOTES
Received report from DARSHAN [place]. Assumed care of patient. Patient is currently laying in bed resting at this time. No complaints of pain, N/V, chest pain or SOB. Abdomen visualized. 4 lap stabs noted with CDI dressings. CMS intact. VSS at this time. Plan of care reviewed. hospitalist paged for orders regarding PRN pain medication and updated diet order. Bed locked and in lowest position with call light within reach.

## 2020-02-25 NOTE — PROGRESS NOTES
Patient's wife called for an update on patient. Patient resting comfortably in bed at this time.

## 2020-02-25 NOTE — OP REPORT
DATE OF SERVICE:  02/24/2020    PREOPERATIVE DIAGNOSES:  Cholelithiasis, possible choledocholithiasis.    POSTOPERATIVE DIAGNOSES:  Cholelithiasis, possible choledocholithiasis.    PROCEDURE:  Laparoscopic cholecystectomy with attempted intraoperative   cholangiogram.    SURGEON:  Catherine Morocho MD    ASSISTANT:  SANDEEP Nowak    ANESTHESIA:  General endotracheal.    ANESTHESIOLOGIST:  Jann Gifford MD    INDICATIONS:  The patient is a 69-year-old gentleman who presented with   recurrent abdominal pain that worsened severely prior to his admission 2 days   ago.  He was found to have markedly elevated liver function tests as well as   an ultrasound showing cholelithiasis.  He was admitted, had an MRCP, which   showed questionable choledocholithiasis.  His liver functions have been   improving.  He is being brought at this time now for laparoscopic   cholecystectomy and intraoperative cholangiogram.    FINDINGS:  Single duct and single artery.  There were stones within the   gallbladder.  Attempted intraoperative cholangiogram was unsuccessful, as we   could not get the catheter to pass into the duct to do the cholangiogram.    PROCEDURE:  After the patient was identified and consented, he was brought to   the operating room and placed in supine position.  Patient underwent general   endotracheal anesthetic clearance.  Patient's abdomen was prepped and draped   in sterile fashion.  Periumbilical was anesthetized with 0.25% Marcaine and   1-cm incision was made.  The abdominal wall was lifted up and Veress needle   was inserted into the abdominal cavity.  After positive drop test,   pneumoperitoneum was obtained.  Veress needle was removed.  With laparoscopic   guidance, a 10-mm trocar was placed in epigastric position and two 5-mm   trocars placed in right subcostal position.  The gallbladder was lifted up and   adhesions were taken down.  The duct was identified in the subcutaneous   tissues.  It was  proximally clipped and a ductotomy was made.  A [place]   catheter was then brought into the field and attempted to be passed into the   cystic duct, which was unsuccessful.  After multiple attempts, we elected to   abandon the intraoperative cholangiogram.  The duct was then doubly clipped   and transected as was the artery.  Gallbladder was excised from the liver bed   using cautery, it was not in the process of excision.  It was brought through   the epigastric port.  Liver bed was irrigated and hemostasis secured.  Port   sites were removed and pneumoperitoneum released.  All ports were closed with   4-0 Vicryls.  OpSite dressing was placed on the wounds.  Patient was extubated   and taken to recovery room in stable condition.  All sponge and needle counts   were correct.       ____________________________________     LEOBARDO PAYNE MD    [facility] / [facility]    DD:  02/24/2020 17:49:44  DT:  02/24/2020 18:34:41    D#:  [id number]  Job#:  [id number]    cc: Jann Gifford M.D., KARI GUZMAN MD, Winston Turcios MD

## 2020-02-25 NOTE — CARE PLAN
Problem: Pain Management  Goal: Pain level will decrease to patient's comfort goal  Outcome: PROGRESSING AS EXPECTED   Patient pain mild at this time. No pain medication requested. Rest, distraction, and ambulation encouraged at this time. Will continue to monitor pain management.     Problem: Discharge Barriers/Planning  Goal: Patient's continuum of care needs will be met  Outcome: PROGRESSING AS EXPECTED  In anticipation of discharge, patient has walked and urinated post surgery. Patient tolerating solids and liquids with no N/V. Pain tolerable at this time.     Problem: Venous Thromboembolism (VTW)/Deep Vein Thrombosis (DVT) Prevention:  Goal: Patient will participate in Venous Thrombosis (VTE)/Deep Vein Thrombosis (DVT)Prevention Measures  Outcome: PROGRESSING AS EXPECTED  Flowsheets (Taken 2/25/2020 0037)  Mechanical Prophylaxis: SCDs, Sequential Compression Device  SCDs, Sequential Compression Device: On

## 2020-02-25 NOTE — ANESTHESIA POSTPROCEDURE EVALUATION
Patient: Marquez Crawford    Procedure Summary     Date:  02/24/20 Room / Location:   OR  / SURGERY [facility]    Anesthesia Start:  1722 Anesthesia Stop:  1756    Procedure:  CHOLECYSTECTOMY, LAPAROSCOPIC (N/A Abdomen) Diagnosis:  (Cholelithiasis )    Surgeon:  Catherine Morocho M.D. Responsible Provider:  Jann Gifford M.D.    Anesthesia Type:  general ASA Status:  2          Final Anesthesia Type: general  Last vitals  BP   Blood Pressure : 144/82    Temp   37.1 °C (98.8 °F)    Pulse   Pulse: 62   Resp   16    SpO2   95 %      Anesthesia Post Evaluation    Patient location during evaluation: PACU  Patient participation: complete - patient participated  Level of consciousness: awake and alert  Pain score: 2    Airway patency: patent  Anesthetic complications: no  Cardiovascular status: adequate and hemodynamically stable  Respiratory status: acceptable  Hydration status: acceptable    PONV: none           Nurse Pain Score: 4 (NPRS)

## 2020-02-25 NOTE — ANESTHESIA QCDR
2019 [facility] Clinical Data Registry (for Quality Improvement)     Postoperative nausea/vomiting risk protocol (Adult = 18 yrs and Pediatric 3-17 yrs)- (430 and 463)  General inhalation anesthetic (NOT TIVA) with PONV risk factors: Yes  Provision of anti-emetic therapy with at least 2 different classes of agents: Yes   Patient DID NOT receive anti-emetic therapy and reason is documented in Medical Record:  N/A    Multimodal Pain Management- (477)  Non-emergent surgery AND patient age >= 18: No  Use of Multimodal Pain Management, two or more drugs and/or interventions, NOT including systemic opioids:   Exception: Documented allergy to multiple classes of analgesics:     Smoking Abstinence (404)  Patient is current smoker (cigarette, pipe, e-cig, marijuanna): No  Elective Surgery:   Abstinence instructions provided prior to day of surgery:   Patient abstained from smoking on day of surgery:     Pre-Op Beta-Blocker in Isolated CABG (44)  Isolated CABG AND patient age >= 18: No  Beta-blocker admin within 24 hours of surgical incision:   Exception:of medical reason(s) for not administering beta blocker within 24 hours prior to surgical incision (e.g., not  indicated,other medical reason):     PACU assessment of acute postoperative pain prior to Anesthesia Care End- Applies to Patients Age = 18- (ABG7)  Initial PACU pain score is which of the following: < 7/10  Patient unable to report pain score: N/A    Post-anesthetic transfer of care checklist/protocol to PACU/ICU- (426 and 427)  Upon conclusion of case, patient transferred to which of the following locations: PACU/Non-ICU  Use of transfer checklist/protocol: Yes  Exclusion: Service Performed in Patient Hospital Room (and thus did not require transfer): N/A  Unplanned admission to ICU related to anesthesia service up through end of PACU care- (MD51)  Unplanned admission to ICU (not initially anticipated at anesthesia start time): No

## 2020-02-25 NOTE — PROGRESS NOTES
Dr. Martinez returned page. Patient orders updated and imputed by Dr. Martinez. Diet changed from NPO to Consistent carbohydrate. PRN pain medications ordered as well.

## 2020-02-25 NOTE — CARE PLAN
A+O x 4  Pod 1, s/p lap ccy  4 trochar sites with gauze and tegaderm - c/d/I  Denies pain  +flatus  Tolerating diet with no n/v/abd pain  Patient requesting discharge this am  Dr. Morocho assessed patient this am - ok to be discharged   Patient updated that hospitalist must sign off for discharge  No needs at this time, wctm    Problem: Bowel/Gastric:  Goal: Normal bowel function is maintained or improved  Outcome: MET     Problem: Discharge Barriers/Planning  Goal: Patient's continuum of care needs will be met  Outcome: MET     Problem: Pain Management  Goal: Pain level will decrease to patient's comfort goal  Outcome: MET    Fall precautions/hourly rounding maintained, call light within reach and functioning, all items within reach.  Patient encouraged to call for assistance, poc reviewed with patient, ?'s/concerns answered.

## 2020-02-25 NOTE — PROGRESS NOTES
Trauma / Surgical Daily Progress Note    Date of Service  2/25/2020    Chief Complaint  69 y.o. male admitted 2/22/2020 with Choledocholithiasis    Interval Events  SP lap chris. Unable to complete IOC. LFTs essentially unchanged. Pt wants to go home. Reasonable and if pain returns, ERCP then. Await GI input on this.    Review of Systems  Review of Systems   Gastrointestinal: Negative for abdominal pain.        Vital Signs for last 24 hours  Temp:  [36.8 °C (98.2 °F)-37.1 °C (98.8 °F)] 36.8 °C (98.3 °F)  Pulse:  [56-98] 63  Resp:  [16] 16  BP: (103-153)/(57-94) 103/57  SpO2:  [94 %-96 %] 94 %    Hemodynamic parameters for last 24 hours       Respiratory Data     Respiration: 16, Pulse Oximetry: 94 %        RUL Breath Sounds: Clear, RML Breath Sounds: Clear, RLL Breath Sounds: Clear, NEHA Breath Sounds: Clear, LLL Breath Sounds: Clear    Physical Exam  Physical Exam  Neck:      Musculoskeletal: Neck supple.   Cardiovascular:      Rate and Rhythm: Normal rate.   Pulmonary:      Effort: Pulmonary effort is normal.   Abdominal:      General: There is no distension.      Palpations: Abdomen is soft.      Tenderness: There is no abdominal tenderness.   Skin:     General: Skin is warm.   Neurological:      Mental Status: He is alert.         Laboratory  Recent Results (from the past 24 hour(s))   Histology Request    Collection Time: 02/24/20  5:57 PM   Result Value Ref Range    Pathology Request Sent to Histo    Comp Metabolic Panel    Collection Time: 02/25/20  4:05 AM   Result Value Ref Range    Sodium 137 135 - 145 mmol/L    Potassium 4.8 3.6 - 5.5 mmol/L    Chloride 105 96 - 112 mmol/L    Co2 20 20 - 33 mmol/L    Anion Gap 12.0 7.0 - 16.0    Glucose 195 (H) 65 - 99 mg/dL    Bun 27 (H) 8 - 22 mg/dL    Creatinine 1.25 0.50 - 1.40 mg/dL    Calcium 8.9 8.4 - 10.2 mg/dL    AST(SGOT) 100 (H) 12 - 45 U/L    ALT(SGPT) 317 (H) 2 - 50 U/L    Alkaline Phosphatase 164 (H) 30 - 99 U/L    Total Bilirubin 1.7 (H) 0.1 - 1.5 mg/dL     Albumin 3.6 3.2 - 4.9 g/dL    Total Protein 6.5 6.0 - 8.2 g/dL    Globulin 2.9 1.9 - 3.5 g/dL    A-G Ratio 1.2 g/dL   ESTIMATED GFR    Collection Time: 02/25/20  4:05 AM   Result Value Ref Range    GFR If African American >60 >60 mL/min/1.73 m 2    GFR If Non  57 (A) >60 mL/min/1.73 m 2       Fluids    Intake/Output Summary (Last 24 hours) at 2/25/2020 0657  Last data filed at 2/25/2020 0400  Gross per 24 hour   Intake 5530 ml   Output --   Net 5530 ml       Core Measures & Quality Metrics  Labs reviewed and Medications reviewed  Gusman catheter: No Gusman                  MAHOGANY Score  ETOH Screening    Assessment/Plan  No new Assessment & Plan notes have been filed under this hospital service since the last note was generated.  Service: Surgery General      Discussed patient condition with RN and Patient.  CRITICAL CARE TIME EXCLUDING PROCEDURES 20   minutes

## 2020-02-25 NOTE — CONSULTS
DATE OF SERVICE:  02/23/2020    SURGICAL CONSULTATION    REQUESTING PHYSICIAN:  Felicia Fischer MD    REASON FOR CONSULTATION:  The patient is a 69-year-old male who was admitted   to the emergency room with abdominal pain and had epigastric pain as well as   nausea.    HISTORY OF PRESENT ILLNESS:  He was brought to the emergency room where he was   found to have elevated liver function tests and ultrasound showing sludge   within the gallbladder.  He ultimately was admitted and had an MRCP, which   demonstrated questionable choledocholithiasis.  I have been asked to see him   in regards to this.    PAST MEDICAL HISTORY:  Illnesses:  History of reflux and dyslipidemia.    PAST SURGICAL HISTORY:  Tonsillectomy and knee surgery.    MEDICATIONS:  Currently, Prevacid, aspirin, Flonase, Pravachol.    ALLERGIES:  TO LIPITOR AND STATINS.    SOCIAL HISTORY:  He is .  He does not smoke.  He does drink.    REVIEW OF SYSTEMS:  Otherwise, unremarkable.    PHYSICAL EXAMINATION:  VITAL SIGNS:  He is afebrile.  HEENT:  He is icteric.  NECK:  Supple.  ABDOMEN:  Soft with minimal tenderness in the epigastrium.  EXTREMITIES:  Without edema.  NEUROLOGIC:  Intact.    LABORATORY DATA AND IMAGING:  White count was 6000, hemoglobin 18.  His   initial liver functions demonstrated a bilirubin of 4 and transaminases of 240   and 739.  They now dropped down since his MRCP.  MRCP reviewed, demonstrated   mildly dilated duct with questionable cholelithiasis.    IMPRESSION:  A 69-year-old male with cholelithiasis and possible   choledocholithiasis.    PLAN:  After discussion with Dr. Turcios about ERCP versus laparoscopic   cholecystectomy with intraoperative cholangiogram, since the liver functions   are improving, we will proceed with that.  It was explained to him as well as   risks including bleeding, infection, conversion to an open procedure,   intraabdominal and anesthetic risks.  He understands and wished to proceed.        ____________________________________     MD SERENA DONG    DD:  02/24/2020 17:20:21  DT:  02/24/2020 20:14:30    D#:  [id number]  Job#:  [id number]

## 2020-02-25 NOTE — OR NURSING
1752 To PACU from OR via bed s/p lap choli. Pt awake, respirations spontaneous and non-labored. Four dressed lap stab sites to pt's abdomen. Pt reports 2/10 tolerable discomfort to his abdomen.     1800 Pt's wife updated by DARSHAN Alvarado.     1805 Pt reports 4/10 tolerable discomfort to his abdomen.     1820 No changes. Report to [facility] DARSHAN [place].

## 2020-02-26 ASSESSMENT — ENCOUNTER SYMPTOMS
WHEEZING: 0
VOMITING: 1
EYE DISCHARGE: 0
ABDOMINAL PAIN: 1
BACK PAIN: 1
DIARRHEA: 0
COUGH: 0
SORE THROAT: 0
EYE PAIN: 0
FEVER: 1
FLANK PAIN: 1
SHORTNESS OF BREATH: 0
NAUSEA: 1
HEMATOCHEZIA: 0
EYE REDNESS: 0
BLOOD IN STOOL: 0

## 2020-03-17 ENCOUNTER — OFFICE VISIT (OUTPATIENT)
Dept: MEDICAL GROUP | Facility: PHYSICIAN GROUP | Age: 70
End: 2020-03-17
Payer: MEDICARE

## 2020-03-17 VITALS
DIASTOLIC BLOOD PRESSURE: 76 MMHG | RESPIRATION RATE: 16 BRPM | BODY MASS INDEX: 27.78 KG/M2 | SYSTOLIC BLOOD PRESSURE: 132 MMHG | TEMPERATURE: 97.5 F | HEIGHT: 69 IN | HEART RATE: 65 BPM | WEIGHT: 187.6 LBS | OXYGEN SATURATION: 96 %

## 2020-03-17 DIAGNOSIS — Z88.8 ALLERGY TO STATIN MEDICATION: ICD-10-CM

## 2020-03-17 DIAGNOSIS — R79.89 ELEVATED LFTS: ICD-10-CM

## 2020-03-17 DIAGNOSIS — Z87.19 HISTORY OF GALLSTONES: ICD-10-CM

## 2020-03-17 DIAGNOSIS — E78.5 HYPERLIPIDEMIA LDL GOAL <100: ICD-10-CM

## 2020-03-17 DIAGNOSIS — R73.9 HYPERGLYCEMIA: ICD-10-CM

## 2020-03-17 PROCEDURE — 8041 PR SCP AHA: Performed by: FAMILY MEDICINE

## 2020-03-17 PROCEDURE — 99214 OFFICE O/P EST MOD 30 MIN: CPT | Performed by: FAMILY MEDICINE

## 2020-03-17 RX ORDER — TRIAMCINOLONE ACETONIDE 1 MG/G
OINTMENT TOPICAL 2 TIMES DAILY
COMMUNITY
End: 2020-11-02 | Stop reason: SDUPTHER

## 2020-03-17 ASSESSMENT — ENCOUNTER SYMPTOMS
DOUBLE VISION: 0
GASTROINTESTINAL NEGATIVE: 1
HEMOPTYSIS: 0
CARDIOVASCULAR NEGATIVE: 1
BRUISES/BLEEDS EASILY: 0
COUGH: 0
PSYCHIATRIC NEGATIVE: 1
EYES NEGATIVE: 1
MUSCULOSKELETAL NEGATIVE: 1
FEVER: 0
TINGLING: 0
BLURRED VISION: 0
HEARTBURN: 0
DIZZINESS: 0
CONSTITUTIONAL NEGATIVE: 1
CHILLS: 0
RESPIRATORY NEGATIVE: 1
NAUSEA: 0
NEUROLOGICAL NEGATIVE: 1
DEPRESSION: 0
MYALGIAS: 0
HEADACHES: 0
PALPITATIONS: 0

## 2020-03-17 ASSESSMENT — PATIENT HEALTH QUESTIONNAIRE - PHQ9: CLINICAL INTERPRETATION OF PHQ2 SCORE: 0

## 2020-03-17 ASSESSMENT — FIBROSIS 4 INDEX: FIB4 SCORE: 2.67

## 2020-03-17 NOTE — PROGRESS NOTES
Subjective:      Marquez Crawford is a 69 y.o. male who presents with [facility] Care (pulse 8)            1. Hyperlipidemia LDL goal <100  Seen on recent labs, allergic to statins will check again  - Comp Metabolic Panel; Future  - HEMOGLOBIN A1C; Future  - Lipid Profile; Future  - CBC WITHOUT DIFFERENTIAL; Future    2. Hyperglycemia  On recent labs when he had his gallbladder out  Has changed diet and will recheck  - Comp Metabolic Panel; Future  - HEMOGLOBIN A1C; Future  - Lipid Profile; Future  - CBC WITHOUT DIFFERENTIAL; Future    3. Elevated LFTs  Will recheck since gb out and stopped drinking 2-3 brandies per night  - Comp Metabolic Panel; Future  - HEMOGLOBIN A1C; Future  - Lipid Profile; Future  - CBC WITHOUT DIFFERENTIAL; Future    4. History of gallstones      5. Allergy to statin medication      History reviewed. No pertinent past medical history.  Past Surgical History:  2/24/2020: EDILBERTO BY LAPAROSCOPY; N/A      Comment:  Procedure: CHOLECYSTECTOMY, LAPAROSCOPIC;  Surgeon:                Catherine Morocho M.D.;  Location: SURGERY [facility];  Service: General  Social History    Tobacco Use      Smoking status: Never Smoker      Smokeless tobacco: Never Used    Alcohol use: Not Currently    Drug use: Yes      Types: Marijuana      Comment: occ marijuana    No family history on file.      Current Outpatient Medications: •  triamcinolone acetonide (KENALOG) 0.1 % Ointment, Apply  to affected area(s) 2 times a day. PRN, Disp: , Rfl: •  acetaminophen (TYLENOL) 500 MG Tab, Take 2 Tabs by mouth every 6 hours., Disp: 30 Tab, Rfl: 0•  fluticasone (FLONASE) 50 MCG/ACT nasal spray, Spray 1 Spray in nose as needed. Indications: Signs and Symptoms of Nose Diseases, Disp: , Rfl: •  VITAMIN E PO, Take 1 Cap by mouth every day., Disp: , Rfl: •  multivitamin (THERAGRAN) Tab, Take 1 Tab by mouth every day., Disp: , Rfl: •  Saw Palmetto, Serenoa repens, (SAW PALMETTO PO), Take 1 Cap by mouth  "every day., Disp: , Rfl: •  aspirin (ASA) 325 MG Tab, Take 650 mg by mouth every 6 hours as needed for Inflammation., Disp: , Rfl: •  Lansoprazole (PREVACID PO), Take 1 Tab by mouth every day., Disp: , Rfl:     Patient was instructed on the use of medications, either prescriptions or OTC and informed on when the appropriate follow up time period should be. In addition, patient was also instructed that should any acute worsening occur that they should notify this clinic asap or call 911.          Review of Systems   Constitutional: Negative.  Negative for chills and fever.   HENT: Negative.  Negative for hearing loss.    Eyes: Negative.  Negative for blurred vision and double vision.   Respiratory: Negative.  Negative for cough and hemoptysis.    Cardiovascular: Negative.  Negative for chest pain and palpitations.   Gastrointestinal: Negative.  Negative for heartburn and nausea.   Genitourinary: Negative.  Negative for dysuria.   Musculoskeletal: Negative.  Negative for myalgias.   Skin: Negative.  Negative for rash.   Neurological: Negative.  Negative for dizziness, tingling and headaches.   Endo/Heme/Allergies: Negative.  Does not bruise/bleed easily.   Psychiatric/Behavioral: Negative.  Negative for depression and suicidal ideas.   All other systems reviewed and are negative.         Objective:     /76 (BP Location: Left arm, Patient Position: Sitting)   Pulse 65   Temp 36.4 °C (97.5 °F) (Tympanic)   Resp 16   Ht 1.753 m (5' 9\")   Wt 85.1 kg (187 lb 9.6 oz)   SpO2 96%   BMI 27.70 kg/m²      Physical Exam  Vitals signs and nursing note reviewed.   Constitutional:       General: He is not in acute distress.     Appearance: He is well-developed. He is not diaphoretic.   HENT:      Head: Normocephalic and atraumatic.      Mouth/Throat:      Pharynx: No oropharyngeal exudate.   Eyes:      Pupils: Pupils are equal, round, and reactive to light.   Cardiovascular:      Rate and Rhythm: Normal rate and regular " rhythm.      Heart sounds: Normal heart sounds. No murmur. No friction rub. No gallop.    Pulmonary:      Effort: Pulmonary effort is normal. No respiratory distress.      Breath sounds: Normal breath sounds. No wheezing or rales.   Chest:      Chest wall: No tenderness.   Neurological:      Mental Status: He is alert and oriented to person, place, and time.   Psychiatric:         Behavior: Behavior normal.         Thought Content: Thought content normal.         Judgment: Judgment normal.                 Assessment/Plan:       1. Hyperlipidemia LDL goal <100    - Comp Metabolic Panel; Future  - HEMOGLOBIN A1C; Future  - Lipid Profile; Future  - CBC WITHOUT DIFFERENTIAL; Future    2. Hyperglycemia    - Comp Metabolic Panel; Future  - HEMOGLOBIN A1C; Future  - Lipid Profile; Future  - CBC WITHOUT DIFFERENTIAL; Future    3. Elevated LFTs    - Comp Metabolic Panel; Future  - HEMOGLOBIN A1C; Future  - Lipid Profile; Future  - CBC WITHOUT DIFFERENTIAL; Future    4. History of gallstones      5. Allergy to statin medication

## 2020-03-25 ENCOUNTER — PATIENT OUTREACH (OUTPATIENT)
Dept: HEALTH INFORMATION MANAGEMENT | Facility: OTHER | Age: 70
End: 2020-03-25

## 2020-04-02 NOTE — PROGRESS NOTES
A 69-year-old male was an emergent admission to [facility] from 2/22/2020 to 2/25/2020 to treat Other cholelithiasis with obstruction. IHD visited the patient bedside. The patient was discharged Home. No additional medical conditions were listed. The patient was not under clinical case management.    The patient was ordered to start/continue to take the following medications: Oxycodone Ir (Oxy Ir) and Acetaminophen. The patient successfully filled all medications.    The patient was ordered to follow-up with PCP and Specialist. The patient had the following appointments:     1) 3/4/2020 @ 11:30 Catherine Morocho, general surgery - CONFIRMED AS KEPT     2) 3/17/2020 @ 10:15 Bob Gage, general/family practice - CONFIRMED AS KEPT  The patient has no future appointments scheduled.     IHD communicated with the patient/caregiver via email and phone throughout the case and collaborated with stakeholders on behalf of the patient regarding coordination of care. IHD identified that the patient had Healthcare Access barriers. Information Provided to Patient for In-Network Provider with [facility]  ([facility]).     IHD followed the patient for a total of 31 days and Patient  was receptive to services. In summary, IHD Connected patient with new physicians and Provided education to patient (health edu, benefits, resources, etc.). As a result, IHD established patient with new provider(s), Patient adhered with Discharge Orders, and Patient attended follow up appointments  .

## 2020-08-13 ENCOUNTER — HOSPITAL ENCOUNTER (OUTPATIENT)
Dept: LAB | Facility: MEDICAL CENTER | Age: 70
End: 2020-08-13
Attending: FAMILY MEDICINE
Payer: MEDICARE

## 2020-08-13 DIAGNOSIS — R79.89 ELEVATED LFTS: ICD-10-CM

## 2020-08-13 DIAGNOSIS — R73.9 HYPERGLYCEMIA: ICD-10-CM

## 2020-08-13 DIAGNOSIS — E78.5 HYPERLIPIDEMIA LDL GOAL <100: ICD-10-CM

## 2020-08-13 LAB
ALBUMIN SERPL BCP-MCNC: 4.4 G/DL (ref 3.2–4.9)
ALBUMIN/GLOB SERPL: 1.5 G/DL
ALP SERPL-CCNC: 80 U/L (ref 30–99)
ALT SERPL-CCNC: 24 U/L (ref 2–50)
ANION GAP SERPL CALC-SCNC: 13 MMOL/L (ref 7–16)
AST SERPL-CCNC: 19 U/L (ref 12–45)
BILIRUB SERPL-MCNC: 0.9 MG/DL (ref 0.1–1.5)
BUN SERPL-MCNC: 28 MG/DL (ref 8–22)
CALCIUM SERPL-MCNC: 9.9 MG/DL (ref 8.5–10.5)
CHLORIDE SERPL-SCNC: 102 MMOL/L (ref 96–112)
CHOLEST SERPL-MCNC: 212 MG/DL (ref 100–199)
CO2 SERPL-SCNC: 26 MMOL/L (ref 20–33)
CREAT SERPL-MCNC: 1.35 MG/DL (ref 0.5–1.4)
ERYTHROCYTE [DISTWIDTH] IN BLOOD BY AUTOMATED COUNT: 45.9 FL (ref 35.9–50)
EST. AVERAGE GLUCOSE BLD GHB EST-MCNC: 108 MG/DL
FASTING STATUS PATIENT QL REPORTED: NORMAL
GLOBULIN SER CALC-MCNC: 3 G/DL (ref 1.9–3.5)
GLUCOSE SERPL-MCNC: 99 MG/DL (ref 65–99)
HBA1C MFR BLD: 5.4 % (ref 0–5.6)
HCT VFR BLD AUTO: 47.7 % (ref 42–52)
HDLC SERPL-MCNC: 71 MG/DL
HGB BLD-MCNC: 16.5 G/DL (ref 14–18)
LDLC SERPL CALC-MCNC: 125 MG/DL
MCH RBC QN AUTO: 33.7 PG (ref 27–33)
MCHC RBC AUTO-ENTMCNC: 34.6 G/DL (ref 33.7–35.3)
MCV RBC AUTO: 97.3 FL (ref 81.4–97.8)
PLATELET # BLD AUTO: 193 K/UL (ref 164–446)
PMV BLD AUTO: 11.3 FL (ref 9–12.9)
POTASSIUM SERPL-SCNC: 4.5 MMOL/L (ref 3.6–5.5)
PROT SERPL-MCNC: 7.4 G/DL (ref 6–8.2)
RBC # BLD AUTO: 4.9 M/UL (ref 4.7–6.1)
SODIUM SERPL-SCNC: 141 MMOL/L (ref 135–145)
TRIGL SERPL-MCNC: 82 MG/DL (ref 0–149)
WBC # BLD AUTO: 9.1 K/UL (ref 4.8–10.8)

## 2020-08-13 PROCEDURE — 36415 COLL VENOUS BLD VENIPUNCTURE: CPT

## 2020-08-13 PROCEDURE — 83036 HEMOGLOBIN GLYCOSYLATED A1C: CPT

## 2020-08-13 PROCEDURE — 80061 LIPID PANEL: CPT

## 2020-08-13 PROCEDURE — 85027 COMPLETE CBC AUTOMATED: CPT

## 2020-08-13 PROCEDURE — 80053 COMPREHEN METABOLIC PANEL: CPT

## 2020-08-19 ENCOUNTER — TELEPHONE (OUTPATIENT)
Dept: MEDICAL GROUP | Facility: PHYSICIAN GROUP | Age: 70
End: 2020-08-19

## 2020-08-19 NOTE — TELEPHONE ENCOUNTER
----- Message from Bob Gage M.D. sent at 8/17/2020  8:56 AM PDT -----  This patient needs an appointment within the next week. Please schedule this with the patient so we may discuss their lab results

## 2020-09-15 ENCOUNTER — OFFICE VISIT (OUTPATIENT)
Dept: MEDICAL GROUP | Facility: PHYSICIAN GROUP | Age: 70
End: 2020-09-15
Payer: MEDICARE

## 2020-09-15 VITALS
RESPIRATION RATE: 14 BRPM | HEART RATE: 58 BPM | DIASTOLIC BLOOD PRESSURE: 72 MMHG | WEIGHT: 189.4 LBS | TEMPERATURE: 97.3 F | HEIGHT: 69 IN | BODY MASS INDEX: 28.05 KG/M2 | SYSTOLIC BLOOD PRESSURE: 136 MMHG | OXYGEN SATURATION: 96 %

## 2020-09-15 DIAGNOSIS — E78.5 HYPERLIPIDEMIA LDL GOAL <100: ICD-10-CM

## 2020-09-15 DIAGNOSIS — N18.2 CRI (CHRONIC RENAL INSUFFICIENCY), STAGE 2 (MILD): ICD-10-CM

## 2020-09-15 DIAGNOSIS — Z23 NEED FOR VACCINATION: ICD-10-CM

## 2020-09-15 DIAGNOSIS — K21.9 GASTROESOPHAGEAL REFLUX DISEASE WITHOUT ESOPHAGITIS: ICD-10-CM

## 2020-09-15 DIAGNOSIS — Z88.8 ALLERGY TO STATIN MEDICATION: ICD-10-CM

## 2020-09-15 DIAGNOSIS — S60.012A CONTUSION OF LEFT THUMB WITHOUT DAMAGE TO NAIL, INITIAL ENCOUNTER: ICD-10-CM

## 2020-09-15 PROBLEM — K80.50 CHOLEDOCHOLITHIASIS: Status: RESOLVED | Noted: 2020-02-22 | Resolved: 2020-09-15

## 2020-09-15 PROCEDURE — 8041 PR SCP AHA: Performed by: FAMILY MEDICINE

## 2020-09-15 PROCEDURE — 90750 HZV VACC RECOMBINANT IM: CPT | Performed by: FAMILY MEDICINE

## 2020-09-15 PROCEDURE — 90471 IMMUNIZATION ADMIN: CPT | Performed by: FAMILY MEDICINE

## 2020-09-15 PROCEDURE — 99214 OFFICE O/P EST MOD 30 MIN: CPT | Mod: 25 | Performed by: FAMILY MEDICINE

## 2020-09-15 ASSESSMENT — ENCOUNTER SYMPTOMS
CARDIOVASCULAR NEGATIVE: 1
COUGH: 0
HEARTBURN: 0
HEMOPTYSIS: 0
DEPRESSION: 0
NEUROLOGICAL NEGATIVE: 1
FEVER: 0
RESPIRATORY NEGATIVE: 1
EYES NEGATIVE: 1
HEADACHES: 0
TINGLING: 0
CONSTITUTIONAL NEGATIVE: 1
NAUSEA: 0
BLURRED VISION: 0
BRUISES/BLEEDS EASILY: 0
DIZZINESS: 0
CHILLS: 0
PALPITATIONS: 0
PSYCHIATRIC NEGATIVE: 1
GASTROINTESTINAL NEGATIVE: 1
MYALGIAS: 0
DOUBLE VISION: 0

## 2020-09-15 ASSESSMENT — FIBROSIS 4 INDEX: FIB4 SCORE: 1.41

## 2020-09-15 NOTE — PROGRESS NOTES
Subjective:      Marquez Crawford is a 69 y.o. male who presents with Lab Results and Other (thumb (left) )            1. CRI (chronic renal insufficiency), stage 2 (mild)  Patient is currently being followed for chronic renal insufficiency. They are avoiding nsaids and maintaining hydration and following renal diet. Taking all appropriate meds. No new change in urine frequency or volume.      2. Hyperlipidemia LDL goal <100  Currently treated for HLD, taking meds with no new myalgias or joint pain, watching fats in diet  controlled      3. Gastroesophageal reflux disease without esophagitis  Patient is currently being treated for gerd, taking meds with no new symptoms or side effects, is trying to modify diet with decreased acidic foods, caffeine, etoh.  controlled      4. Contusion of left thumb without damage to nail, initial encounter  Hit it with a hammer, some pain in dip  - DX-FINGER(S) 2+ LEFT; Future    5. Need for vaccination    - Shingles Vaccine (Shingrix)    6. Allergy to statin medication  lft's now back to normal once off meds will monitor    No past medical history on file.  Past Surgical History:  2/24/2020: EDILBERTO BY LAPAROSCOPY; N/A      Comment:  Procedure: CHOLECYSTECTOMY, LAPAROSCOPIC;  Surgeon:                Catherine Morocho M.D.;  Location: SURGERY [facility];  Service: General  Social History    Tobacco Use      Smoking status: Never Smoker      Smokeless tobacco: Never Used    Alcohol use: Not Currently    Drug use: Yes      Types: Marijuana      Comment: occ marijuana    No family history on file.      Current Outpatient Medications: •  triamcinolone acetonide (KENALOG) 0.1 % Ointment, Apply  to affected area(s) 2 times a day. PRN, Disp: , Rfl: •  acetaminophen (TYLENOL) 500 MG Tab, Take 2 Tabs by mouth every 6 hours., Disp: 30 Tab, Rfl: 0•  fluticasone (FLONASE) 50 MCG/ACT nasal spray, Spray 1 Spray in nose as needed. Indications: Signs and Symptoms of Nose  "Diseases, Disp: , Rfl: •  VITAMIN E PO, Take 1 Cap by mouth every day., Disp: , Rfl: •  multivitamin (THERAGRAN) Tab, Take 1 Tab by mouth every day., Disp: , Rfl: •  Saw Palmetto, Serenoa repens, (SAW PALMETTO PO), Take 1 Cap by mouth every day., Disp: , Rfl: •  aspirin (ASA) 325 MG Tab, Take 650 mg by mouth every 6 hours as needed for Inflammation., Disp: , Rfl: •  Lansoprazole (PREVACID PO), Take 1 Tab by mouth every day., Disp: , Rfl:     Patient was instructed on the use of medications, either prescriptions or OTC and informed on when the appropriate follow up time period should be. In addition, patient was also instructed that should any acute worsening occur that they should notify this clinic asap or call 911.          Review of Systems   Constitutional: Negative.  Negative for chills and fever.   HENT: Negative.  Negative for hearing loss.    Eyes: Negative.  Negative for blurred vision and double vision.   Respiratory: Negative.  Negative for cough and hemoptysis.    Cardiovascular: Negative.  Negative for chest pain and palpitations.   Gastrointestinal: Negative.  Negative for heartburn and nausea.   Genitourinary: Negative.  Negative for dysuria.   Musculoskeletal: Positive for joint pain. Negative for myalgias.   Skin: Negative.  Negative for rash.   Neurological: Negative.  Negative for dizziness, tingling and headaches.   Endo/Heme/Allergies: Negative.  Does not bruise/bleed easily.   Psychiatric/Behavioral: Negative.  Negative for depression and suicidal ideas.   All other systems reviewed and are negative.         Objective:     /72 (BP Location: Left arm, Patient Position: Sitting, BP Cuff Size: Adult)   Pulse (!) 58   Temp 36.3 °C (97.3 °F) (Temporal)   Resp 14   Ht 1.753 m (5' 9\")   Wt 85.9 kg (189 lb 6.4 oz)   SpO2 96%   BMI 27.97 kg/m²      Physical Exam  Vitals signs and nursing note reviewed.   Constitutional:       General: He is not in acute distress.     Appearance: He is " well-developed. He is not diaphoretic.   HENT:      Head: Normocephalic and atraumatic.      Mouth/Throat:      Pharynx: No oropharyngeal exudate.   Eyes:      Pupils: Pupils are equal, round, and reactive to light.   Cardiovascular:      Rate and Rhythm: Normal rate and regular rhythm.      Heart sounds: Normal heart sounds. No murmur. No friction rub. No gallop.    Pulmonary:      Effort: Pulmonary effort is normal. No respiratory distress.      Breath sounds: Normal breath sounds. No wheezing or rales.   Chest:      Chest wall: No tenderness.   Musculoskeletal:      Left hand: He exhibits tenderness.        Hands:    Neurological:      Mental Status: He is alert and oriented to person, place, and time.   Psychiatric:         Behavior: Behavior normal.         Thought Content: Thought content normal.         Judgment: Judgment normal.                 Assessment/Plan:        1. CRI (chronic renal insufficiency), stage 2 (mild)      2. Hyperlipidemia LDL goal <100      3. Gastroesophageal reflux disease without esophagitis      4. Contusion of left thumb without damage to nail, initial encounter    - DX-FINGER(S) 2+ LEFT; Future    5. Need for vaccination    - Shingles Vaccine (Shingrix)    6. Allergy to statin medication

## 2020-09-29 ENCOUNTER — NURSE TRIAGE (OUTPATIENT)
Dept: HEALTH INFORMATION MANAGEMENT | Facility: OTHER | Age: 70
End: 2020-09-29

## 2020-09-29 ENCOUNTER — OFFICE VISIT (OUTPATIENT)
Dept: MEDICAL GROUP | Facility: PHYSICIAN GROUP | Age: 70
End: 2020-09-29
Payer: MEDICARE

## 2020-09-29 VITALS
HEART RATE: 67 BPM | BODY MASS INDEX: 27.78 KG/M2 | TEMPERATURE: 98 F | SYSTOLIC BLOOD PRESSURE: 138 MMHG | DIASTOLIC BLOOD PRESSURE: 90 MMHG | WEIGHT: 187.6 LBS | OXYGEN SATURATION: 97 % | RESPIRATION RATE: 16 BRPM | HEIGHT: 69 IN

## 2020-09-29 DIAGNOSIS — M25.50 ARTHRALGIA, UNSPECIFIED JOINT: ICD-10-CM

## 2020-09-29 PROCEDURE — 99214 OFFICE O/P EST MOD 30 MIN: CPT | Performed by: FAMILY MEDICINE

## 2020-09-29 ASSESSMENT — ENCOUNTER SYMPTOMS
DOUBLE VISION: 0
BLURRED VISION: 0
FEVER: 0
PALPITATIONS: 0
COUGH: 0
HEARTBURN: 0
BRUISES/BLEEDS EASILY: 0
GASTROINTESTINAL NEGATIVE: 1
NEUROLOGICAL NEGATIVE: 1
MYALGIAS: 0
HEMOPTYSIS: 0
EYES NEGATIVE: 1
HEADACHES: 0
TINGLING: 0
CHILLS: 0
CONSTITUTIONAL NEGATIVE: 1
RESPIRATORY NEGATIVE: 1
DEPRESSION: 0
PSYCHIATRIC NEGATIVE: 1
NAUSEA: 0
CARDIOVASCULAR NEGATIVE: 1
DIZZINESS: 0

## 2020-09-29 ASSESSMENT — FIBROSIS 4 INDEX: FIB4 SCORE: 1.41

## 2020-09-29 NOTE — PROGRESS NOTES
Subjective:      Marquez Crawford is a 69 y.o. male who presents with Edema (hands/shoulders)            1. Arthralgia, unspecified joint  8 years ago had a one month period where he had severe joint pain that resolved spontaneously  Now with 3 weeks of increased pain in joints with swelling but no erythema  Worse in morning and improves with nsaids and hot shower and use  Worse in wrists and hands and elbows, no recent trauma  Will check labs for auto immune disease otherwise will increase RICE  - Comp Metabolic Panel; Future  - FREE THYROXINE; Future  - TRIIDOTHYRONINE; Future  - VITAMIN D,25 HYDROXY; Future  - CBC WITH DIFFERENTIAL; Future  - Sed Rate; Future  - LYDIA COMPREHENSIVE PANEL  - RHEUMATOID ARTHRITIS FACTOR; Future  - CCP; Future  - URIC ACID; Future    No past medical history on file.  Past Surgical History:  2/24/2020: EDILBERTO BY LAPAROSCOPY; N/A      Comment:  Procedure: CHOLECYSTECTOMY, LAPAROSCOPIC;  Surgeon:                Catherine Morocho M.D.;  Location: SURGERY [facility];  Service: General  Social History    Tobacco Use      Smoking status: Never Smoker      Smokeless tobacco: Never Used    Alcohol use: Not Currently    Drug use: Yes      Types: Marijuana      Comment: occ marijuana    No family history on file.      Current Outpatient Medications: •  triamcinolone acetonide (KENALOG) 0.1 % Ointment, Apply  to affected area(s) 2 times a day. PRN, Disp: , Rfl: •  acetaminophen (TYLENOL) 500 MG Tab, Take 2 Tabs by mouth every 6 hours., Disp: 30 Tab, Rfl: 0•  fluticasone (FLONASE) 50 MCG/ACT nasal spray, Spray 1 Spray in nose as needed. Indications: Signs and Symptoms of Nose Diseases, Disp: , Rfl: •  VITAMIN E PO, Take 1 Cap by mouth every day., Disp: , Rfl: •  multivitamin (THERAGRAN) Tab, Take 1 Tab by mouth every day., Disp: , Rfl: •  Saw Palmetto, Serenoa repens, (SAW PALMETTO PO), Take 1 Cap by mouth every day., Disp: , Rfl: •  aspirin (ASA) 325 MG Tab, Take 650 mg  "by mouth every 6 hours as needed for Inflammation., Disp: , Rfl: •  Lansoprazole (PREVACID PO), Take 1 Tab by mouth every day., Disp: , Rfl:     Patient was instructed on the use of medications, either prescriptions or OTC and informed on when the appropriate follow up time period should be. In addition, patient was also instructed that should any acute worsening occur that they should notify this clinic asap or call 911.          Review of Systems   Constitutional: Negative.  Negative for chills and fever.   HENT: Negative.  Negative for hearing loss.    Eyes: Negative.  Negative for blurred vision and double vision.   Respiratory: Negative.  Negative for cough and hemoptysis.    Cardiovascular: Negative.  Negative for chest pain and palpitations.   Gastrointestinal: Negative.  Negative for heartburn and nausea.   Genitourinary: Negative.  Negative for dysuria.   Musculoskeletal: Positive for joint pain. Negative for myalgias.   Skin: Negative.  Negative for rash.   Neurological: Negative.  Negative for dizziness, tingling and headaches.   Endo/Heme/Allergies: Negative.  Does not bruise/bleed easily.   Psychiatric/Behavioral: Negative.  Negative for depression and suicidal ideas.   All other systems reviewed and are negative.         Objective:     /90 (BP Location: Left arm, Patient Position: Sitting, BP Cuff Size: Adult)   Pulse 67   Temp 36.7 °C (98 °F) (Temporal)   Resp 16   Ht 1.746 m (5' 8.75\")   Wt 85.1 kg (187 lb 9.6 oz)   SpO2 97%   BMI 27.91 kg/m²      Physical Exam  Vitals signs and nursing note reviewed.   Constitutional:       General: He is not in acute distress.     Appearance: He is well-developed. He is not diaphoretic.   HENT:      Head: Normocephalic and atraumatic.      Mouth/Throat:      Pharynx: No oropharyngeal exudate.   Eyes:      Pupils: Pupils are equal, round, and reactive to light.   Cardiovascular:      Rate and Rhythm: Normal rate and regular rhythm.      Heart sounds: " Normal heart sounds. No murmur. No friction rub. No gallop.    Pulmonary:      Effort: Pulmonary effort is normal. No respiratory distress.      Breath sounds: Normal breath sounds. No wheezing or rales.   Chest:      Chest wall: No tenderness.   Neurological:      Mental Status: He is alert and oriented to person, place, and time.   Psychiatric:         Behavior: Behavior normal.         Thought Content: Thought content normal.         Judgment: Judgment normal.                 Assessment/Plan:        1. Arthralgia, unspecified joint    - Comp Metabolic Panel; Future  - FREE THYROXINE; Future  - TRIIDOTHYRONINE; Future  - VITAMIN D,25 HYDROXY; Future  - CBC WITH DIFFERENTIAL; Future  - Sed Rate; Future  - LYDIA COMPREHENSIVE PANEL  - RHEUMATOID ARTHRITIS FACTOR; Future  - CCP; Future  - URIC ACID; Future

## 2020-09-29 NOTE — TELEPHONE ENCOUNTER
"  Reason for Disposition  • MODERATE swelling of both ankles (e.g., swelling extends up to the knees) AND new onset or worsening    Additional Information  • Negative: Sounds like a life-threatening emergency to the triager  • Negative: Chest pain  • Negative: Small area of swelling and followed an insect bite to the area  • Negative: Followed a knee injury  • Negative: Ankle or foot injury  • Negative: Pregnant with leg swelling or edema  • Negative: Difficulty breathing at rest  • Negative: Entire foot is cool or blue in comparison to other side  • Negative: SEVERE swelling (e.g., swelling extends above knee, entire leg is swollen, weeping fluid)  • Negative: Thigh or calf pain and only 1 side and present > 1 hour  • Negative: Thigh, calf, or ankle swelling in only one leg  • Negative: Thigh, calf, or ankle swelling in both legs, but one side is definitely more swollen  • Negative: Cast on leg or ankle and has increasing pain  • Negative: Can't walk or can barely stand (new onset)  • Negative: Fever and red area (or area very tender to touch)  • Negative: Patient sounds very sick or weak to the triager  • Negative: Swelling of face, arm or hands (Exception: slight puffiness of fingers during hot weather)  • Negative: Pregnant > 20 weeks and sudden weight gain (i.e., > 2 lbs, 1 kg in one week)    Answer Assessment - Initial Assessment Questions  1. ONSET: \"When did the swelling start?\" (e.g., minutes, hours, days)      A month  2. LOCATION: \"What part of the leg is swollen?\"  \"Are both legs swollen or just one leg?\"      Legs, hands, shoulders,   3. SEVERITY: \"How bad is the swelling?\" (e.g., localized; mild, moderate, severe)   - Localized - small area of swelling localized to one leg   - MILD pedal edema - swelling limited to foot and ankle, pitting edema < 1/4 inch (6 mm) deep, rest and elevation eliminate most or all swelling   - MODERATE edema - swelling of lower leg to knee, pitting edema > 1/4 inch (6 mm) " "deep, rest and elevation only partially reduce swelling   - SEVERE edema - swelling extends above knee, facial or hand swelling present       moderate  4. REDNESS: \"Does the swelling look red or infected?\"      no  5. PAIN: \"Is the swelling painful to touch?\" If so, ask: \"How painful is it?\"   (Scale 1-10; mild, moderate or severe)      7  6. FEVER: \"Do you have a fever?\" If so, ask: \"What is it, how was it measured, and when did it start?\"       no  7. CAUSE: \"What do you think is causing the leg swelling?\"      no  8. MEDICAL HISTORY: \"Do you have a history of heart failure, kidney disease, liver failure, or cancer?\"      no  9. RECURRENT SYMPTOM: \"Have you had leg swelling before?\" If so, ask: \"When was the last time?\" \"What happened that time?\"      A month  10. OTHER SYMPTOMS: \"Do you have any other symptoms?\" (e.g., chest pain, difficulty breathing)        no  11. PREGNANCY: \"Is there any chance you are pregnant?\" \"When was your last menstrual period?\"        no    Protocols used: LEG SWELLING AND EDEMA-A-OH    "

## 2020-09-29 NOTE — TELEPHONE ENCOUNTER
Regarding: WHEN PT WAKES UP IN THE AM HE HAS SWOLLEN KNEES AND HANDS AND MOVES TO SHOULDER & WRIST AND PT HAD B  ----- Message from Dank Ott sent at 9/29/2020 12:43 PM PDT -----  WHEN PT WAKES UP IN THE AM HE HAS SWOLLEN KNEES AND HANDS AND MOVES TO SHOULDER & WRIST AND PT HAD BODY ACHES  ALL DAY

## 2020-09-30 ENCOUNTER — HOSPITAL ENCOUNTER (OUTPATIENT)
Dept: LAB | Facility: MEDICAL CENTER | Age: 70
End: 2020-09-30
Attending: FAMILY MEDICINE
Payer: MEDICARE

## 2020-09-30 DIAGNOSIS — M25.50 ARTHRALGIA, UNSPECIFIED JOINT: ICD-10-CM

## 2020-09-30 LAB
ALBUMIN SERPL BCP-MCNC: 4.2 G/DL (ref 3.2–4.9)
ALBUMIN/GLOB SERPL: 1.2 G/DL
ALP SERPL-CCNC: 142 U/L (ref 30–99)
ALT SERPL-CCNC: 65 U/L (ref 2–50)
ANION GAP SERPL CALC-SCNC: 14 MMOL/L (ref 7–16)
AST SERPL-CCNC: 35 U/L (ref 12–45)
BASOPHILS # BLD AUTO: 0.3 % (ref 0–1.8)
BASOPHILS # BLD: 0.03 K/UL (ref 0–0.12)
BILIRUB SERPL-MCNC: 0.8 MG/DL (ref 0.1–1.5)
BUN SERPL-MCNC: 34 MG/DL (ref 8–22)
CALCIUM SERPL-MCNC: 9.9 MG/DL (ref 8.5–10.5)
CHLORIDE SERPL-SCNC: 103 MMOL/L (ref 96–112)
CO2 SERPL-SCNC: 22 MMOL/L (ref 20–33)
CREAT SERPL-MCNC: 1.41 MG/DL (ref 0.5–1.4)
EOSINOPHIL # BLD AUTO: 0.04 K/UL (ref 0–0.51)
EOSINOPHIL NFR BLD: 0.4 % (ref 0–6.9)
ERYTHROCYTE [DISTWIDTH] IN BLOOD BY AUTOMATED COUNT: 45.9 FL (ref 35.9–50)
ERYTHROCYTE [SEDIMENTATION RATE] IN BLOOD BY WESTERGREN METHOD: 31 MM/HOUR (ref 0–20)
GLOBULIN SER CALC-MCNC: 3.4 G/DL (ref 1.9–3.5)
GLUCOSE SERPL-MCNC: 106 MG/DL (ref 65–99)
HCT VFR BLD AUTO: 47.7 % (ref 42–52)
HGB BLD-MCNC: 15.9 G/DL (ref 14–18)
IMM GRANULOCYTES # BLD AUTO: 0.04 K/UL (ref 0–0.11)
IMM GRANULOCYTES NFR BLD AUTO: 0.4 % (ref 0–0.9)
LYMPHOCYTES # BLD AUTO: 1.05 K/UL (ref 1–4.8)
LYMPHOCYTES NFR BLD: 10.1 % (ref 22–41)
MCH RBC QN AUTO: 31.9 PG (ref 27–33)
MCHC RBC AUTO-ENTMCNC: 33.3 G/DL (ref 33.7–35.3)
MCV RBC AUTO: 95.8 FL (ref 81.4–97.8)
MONOCYTES # BLD AUTO: 0.67 K/UL (ref 0–0.85)
MONOCYTES NFR BLD AUTO: 6.4 % (ref 0–13.4)
NEUTROPHILS # BLD AUTO: 8.6 K/UL (ref 1.82–7.42)
NEUTROPHILS NFR BLD: 82.4 % (ref 44–72)
NRBC # BLD AUTO: 0 K/UL
NRBC BLD-RTO: 0 /100 WBC
PLATELET # BLD AUTO: 262 K/UL (ref 164–446)
PMV BLD AUTO: 10.2 FL (ref 9–12.9)
POTASSIUM SERPL-SCNC: 4.3 MMOL/L (ref 3.6–5.5)
PROT SERPL-MCNC: 7.6 G/DL (ref 6–8.2)
RBC # BLD AUTO: 4.98 M/UL (ref 4.7–6.1)
SODIUM SERPL-SCNC: 139 MMOL/L (ref 135–145)
URATE SERPL-MCNC: 6.5 MG/DL (ref 2.5–8.3)
WBC # BLD AUTO: 10.4 K/UL (ref 4.8–10.8)

## 2020-09-30 PROCEDURE — 84550 ASSAY OF BLOOD/URIC ACID: CPT

## 2020-09-30 PROCEDURE — 84480 ASSAY TRIIODOTHYRONINE (T3): CPT

## 2020-09-30 PROCEDURE — 86225 DNA ANTIBODY NATIVE: CPT

## 2020-09-30 PROCEDURE — 82306 VITAMIN D 25 HYDROXY: CPT

## 2020-09-30 PROCEDURE — 80053 COMPREHEN METABOLIC PANEL: CPT

## 2020-09-30 PROCEDURE — 86235 NUCLEAR ANTIGEN ANTIBODY: CPT

## 2020-09-30 PROCEDURE — 83516 IMMUNOASSAY NONANTIBODY: CPT | Mod: 91

## 2020-09-30 PROCEDURE — 85025 COMPLETE CBC W/AUTO DIFF WBC: CPT

## 2020-09-30 PROCEDURE — 85652 RBC SED RATE AUTOMATED: CPT

## 2020-09-30 PROCEDURE — 86431 RHEUMATOID FACTOR QUANT: CPT

## 2020-09-30 PROCEDURE — 36415 COLL VENOUS BLD VENIPUNCTURE: CPT

## 2020-09-30 PROCEDURE — 84439 ASSAY OF FREE THYROXINE: CPT

## 2020-10-01 LAB
25(OH)D3 SERPL-MCNC: 56 NG/ML (ref 30–100)
RHEUMATOID FACT SER IA-ACNC: 12 IU/ML (ref 0–14)
T3 SERPL-MCNC: 94.7 NG/DL (ref 60–181)
T4 FREE SERPL-MCNC: 1.35 NG/DL (ref 0.93–1.7)

## 2020-10-02 ENCOUNTER — TELEPHONE (OUTPATIENT)
Dept: MEDICAL GROUP | Facility: PHYSICIAN GROUP | Age: 70
End: 2020-10-02

## 2020-10-02 LAB
CENTROMERE IGG TITR SER IF: 0 AU/ML (ref 0–40)
DSDNA AB TITR SER CLIF: NORMAL {TITER}
ENA JO1 AB TITR SER: 1 AU/ML (ref 0–40)
ENA SCL70 IGG SER QL: 2 AU/ML (ref 0–40)
ENA SM IGG SER-ACNC: 0 AU/ML (ref 0–40)
ENA SS-B IGG SER IA-ACNC: 3 AU/ML (ref 0–40)
SSA52 R0ENA AB IGG Q0420: 2 AU/ML (ref 0–40)
SSA60 R0ENA AB IGG Q0419: 0 AU/ML (ref 0–40)
U1 SNRNP IGG SER QL: 3 AU/ML (ref 0–40)

## 2020-10-02 NOTE — TELEPHONE ENCOUNTER
----- Message from Bob Gage M.D. sent at 10/1/2020  9:56 AM PDT -----  This patient needs an appointment within the next week. Please schedule this with the patient so we may discuss their lab results

## 2020-10-03 LAB — CHROMATIN IGG SERPL-ACNC: 3 UNITS (ref 0–19)

## 2020-10-05 ENCOUNTER — OFFICE VISIT (OUTPATIENT)
Dept: MEDICAL GROUP | Facility: PHYSICIAN GROUP | Age: 70
End: 2020-10-05
Payer: MEDICARE

## 2020-10-05 VITALS
BODY MASS INDEX: 28.14 KG/M2 | WEIGHT: 190 LBS | OXYGEN SATURATION: 95 % | RESPIRATION RATE: 16 BRPM | HEIGHT: 69 IN | SYSTOLIC BLOOD PRESSURE: 140 MMHG | HEART RATE: 72 BPM | DIASTOLIC BLOOD PRESSURE: 82 MMHG | TEMPERATURE: 98.3 F

## 2020-10-05 DIAGNOSIS — E78.5 HYPERLIPIDEMIA LDL GOAL <100: ICD-10-CM

## 2020-10-05 DIAGNOSIS — M25.50 ARTHRALGIA, UNSPECIFIED JOINT: ICD-10-CM

## 2020-10-05 DIAGNOSIS — Z88.8 ALLERGY TO STATIN MEDICATION: ICD-10-CM

## 2020-10-05 PROCEDURE — 99214 OFFICE O/P EST MOD 30 MIN: CPT | Performed by: FAMILY MEDICINE

## 2020-10-05 RX ORDER — METHYLPREDNISOLONE 4 MG/1
TABLET ORAL
Qty: 21 TAB | Refills: 0 | Status: SHIPPED | OUTPATIENT
Start: 2020-10-05 | End: 2020-10-19 | Stop reason: SDUPTHER

## 2020-10-05 ASSESSMENT — ENCOUNTER SYMPTOMS
HEADACHES: 0
GASTROINTESTINAL NEGATIVE: 1
DIZZINESS: 0
HEARTBURN: 0
CHILLS: 0
HEMOPTYSIS: 0
BLURRED VISION: 0
COUGH: 0
FEVER: 0
NAUSEA: 0
CONSTITUTIONAL NEGATIVE: 1
PALPITATIONS: 0
TINGLING: 0
EYES NEGATIVE: 1
DEPRESSION: 0
NEUROLOGICAL NEGATIVE: 1
CARDIOVASCULAR NEGATIVE: 1
BRUISES/BLEEDS EASILY: 0
DOUBLE VISION: 0
MYALGIAS: 0
RESPIRATORY NEGATIVE: 1
PSYCHIATRIC NEGATIVE: 1

## 2020-10-05 ASSESSMENT — FIBROSIS 4 INDEX: FIB4 SCORE: 1.16

## 2020-10-05 NOTE — PROGRESS NOTES
Subjective:      Marquez Crawford is a 69 y.o. male who presents with Lab Results            1. Arthralgia, unspecified joint  All auto-immune labs negative, sed slightly high  Will treat with steroids due to cri no nsaids  - methylPREDNISolone (MEDROL DOSEPAK) 4 MG Tablet Therapy Pack; As directed on the packaging label.  Dispense: 21 Tab; Refill: 0    2. Hyperlipidemia LDL goal <100  Currently treated for HLD, taking meds with no new myalgias or joint pain, watching fats in diet  controlled      3. Allergy to statin medication  The patient's current medical issue is well controlled on the current therapy with no new symptoms or worsening      No past medical history on file.  Past Surgical History:  2/24/2020: EDILBERTO BY LAPAROSCOPY; N/A      Comment:  Procedure: CHOLECYSTECTOMY, LAPAROSCOPIC;  Surgeon:                Catherine Morocho M.D.;  Location: SURGERY [facility];  Service: General  Social History    Tobacco Use      Smoking status: Never Smoker      Smokeless tobacco: Never Used    Alcohol use: Not Currently    Drug use: Yes      Types: Marijuana      Comment: occ marijuana    No family history on file.      Current Outpatient Medications: •  methylPREDNISolone (MEDROL DOSEPAK) 4 MG Tablet Therapy Pack, As directed on the packaging label., Disp: 21 Tab, Rfl: 0•  triamcinolone acetonide (KENALOG) 0.1 % Ointment, Apply  to affected area(s) 2 times a day. PRN, Disp: , Rfl: •  acetaminophen (TYLENOL) 500 MG Tab, Take 2 Tabs by mouth every 6 hours., Disp: 30 Tab, Rfl: 0•  fluticasone (FLONASE) 50 MCG/ACT nasal spray, Spray 1 Spray in nose as needed. Indications: Signs and Symptoms of Nose Diseases, Disp: , Rfl: •  VITAMIN E PO, Take 1 Cap by mouth every day., Disp: , Rfl: •  multivitamin (THERAGRAN) Tab, Take 1 Tab by mouth every day., Disp: , Rfl: •  Saw Palmetto, Serenoa repens, (SAW PALMETTO PO), Take 1 Cap by mouth every day., Disp: , Rfl: •  aspirin (ASA) 325 MG Tab, Take 650 mg  "by mouth every 6 hours as needed for Inflammation., Disp: , Rfl: •  Lansoprazole (PREVACID PO), Take 1 Tab by mouth every day., Disp: , Rfl:     Patient was instructed on the use of medications, either prescriptions or OTC and informed on when the appropriate follow up time period should be. In addition, patient was also instructed that should any acute worsening occur that they should notify this clinic asap or call 911.          Review of Systems   Constitutional: Negative.  Negative for chills and fever.   HENT: Negative.  Negative for hearing loss.    Eyes: Negative.  Negative for blurred vision and double vision.   Respiratory: Negative.  Negative for cough and hemoptysis.    Cardiovascular: Negative.  Negative for chest pain and palpitations.   Gastrointestinal: Negative.  Negative for heartburn and nausea.   Genitourinary: Negative.  Negative for dysuria.   Musculoskeletal: Positive for joint pain. Negative for myalgias.   Skin: Negative.  Negative for rash.   Neurological: Negative.  Negative for dizziness, tingling and headaches.   Endo/Heme/Allergies: Negative.  Does not bruise/bleed easily.   Psychiatric/Behavioral: Negative.  Negative for depression and suicidal ideas.   All other systems reviewed and are negative.         Objective:     /82 (BP Location: Left arm, Patient Position: Sitting, BP Cuff Size: Adult)   Pulse 72   Temp 36.8 °C (98.3 °F) (Temporal)   Resp 16   Ht 1.746 m (5' 8.75\")   Wt 86.2 kg (190 lb)   SpO2 95%   BMI 28.26 kg/m²      Physical Exam  Vitals signs and nursing note reviewed.   Constitutional:       General: He is not in acute distress.     Appearance: He is well-developed. He is not diaphoretic.   HENT:      Head: Normocephalic and atraumatic.      Mouth/Throat:      Pharynx: No oropharyngeal exudate.   Eyes:      Pupils: Pupils are equal, round, and reactive to light.   Cardiovascular:      Rate and Rhythm: Normal rate and regular rhythm.      Heart sounds: Normal " heart sounds. No murmur. No friction rub. No gallop.    Pulmonary:      Effort: Pulmonary effort is normal. No respiratory distress.      Breath sounds: Normal breath sounds. No wheezing or rales.   Chest:      Chest wall: No tenderness.   Neurological:      Mental Status: He is alert and oriented to person, place, and time.   Psychiatric:         Behavior: Behavior normal.         Thought Content: Thought content normal.         Judgment: Judgment normal.                 Assessment/Plan:        1. Arthralgia, unspecified joint    - methylPREDNISolone (MEDROL DOSEPAK) 4 MG Tablet Therapy Pack; As directed on the packaging label.  Dispense: 21 Tab; Refill: 0    2. Hyperlipidemia LDL goal <100      3. Allergy to statin medication

## 2020-10-19 DIAGNOSIS — M25.50 ARTHRALGIA, UNSPECIFIED JOINT: ICD-10-CM

## 2020-10-19 RX ORDER — METHYLPREDNISOLONE 4 MG/1
TABLET ORAL
Qty: 21 TAB | Refills: 0 | Status: SHIPPED | OUTPATIENT
Start: 2020-10-19 | End: 2020-11-02 | Stop reason: SDUPTHER

## 2020-10-19 NOTE — TELEPHONE ENCOUNTER
Refills have been requested for the following medications:         methylPREDNISolone (MEDROL DOSEPAK) 4 MG Tablet Therapy Pack [Bob Gage M.D.]       Patient Comment: Still have joint pain. Not as bad but still there. The first  dosepak relieved the symptoms and then symptoms returned a few days later. Not quite as bad but still there.     Preferred pharmacy: [facility]/PHARMACY #[id number] - [place], [place] - [address] AT [place] [address]   Delivery method: Pickup     Received request via: Patient    Was the patient seen in the last year in this department? Yes    Does the patient have an active prescription (recently filled or refills available) for medication(s) requested? No

## 2020-11-02 ENCOUNTER — PATIENT MESSAGE (OUTPATIENT)
Dept: MEDICAL GROUP | Facility: PHYSICIAN GROUP | Age: 70
End: 2020-11-02

## 2020-11-02 DIAGNOSIS — M25.50 ARTHRALGIA, UNSPECIFIED JOINT: ICD-10-CM

## 2020-11-03 DIAGNOSIS — M25.50 ARTHRALGIA, UNSPECIFIED JOINT: ICD-10-CM

## 2020-11-03 RX ORDER — METHYLPREDNISOLONE 4 MG/1
TABLET ORAL
Qty: 21 TAB | Refills: 0 | Status: SHIPPED | OUTPATIENT
Start: 2020-11-03 | End: 2021-01-18 | Stop reason: SDUPTHER

## 2020-11-03 RX ORDER — METHYLPREDNISOLONE 4 MG/1
TABLET ORAL
Qty: 21 TAB | Refills: 0 | Status: SHIPPED | OUTPATIENT
Start: 2020-11-03 | End: 2020-12-14 | Stop reason: SDUPTHER

## 2020-11-03 RX ORDER — TRIAMCINOLONE ACETONIDE 1 MG/G
1 OINTMENT TOPICAL 2 TIMES DAILY
Qty: 60 G | Refills: 5 | Status: SHIPPED | OUTPATIENT
Start: 2020-11-03 | End: 2021-12-21

## 2020-11-03 NOTE — TELEPHONE ENCOUNTER
From: Marquez Crawford  To: Office of Bob Gage M.D.  Sent: 11/3/2020 7:23 AM PST  Subject: Medication Renewal Request    Refills have been requested for the following medications:     methylPREDNISolone (MEDROL DOSEPAK) 4 MG Tablet Therapy Pack [Bob Gage M.D.]    Preferred pharmacy: [facility]/PHARMACY #[id number] - [facility] [address] AT [place] [address]  Delivery method: Pickup

## 2020-11-03 NOTE — TELEPHONE ENCOUNTER
From: Marquez Crawford  To: Office of Bob Gage M.D.  Sent: 11/2/2020 4:25 PM PST  Subject: Medication Renewal Request    Refills have been requested for the following medications:     methylPREDNISolone (MEDROL DOSEPAK) 4 MG Tablet Therapy Pack [Bob Gage M.D.]    Preferred pharmacy: [facility]/PHARMACY #[id number] - [facility] [address] AT [place] [address]  Delivery method: Pickup

## 2020-11-03 NOTE — TELEPHONE ENCOUNTER
From: Marquez Crawford  To: Bob Gage M.D.  Sent: 11/2/2020 4:41 PM PST  Subject: Prescription Question    Dr. Gage, I would like to know if I can get a prescription for triamcinolone acetonide 0.1 % Oint  Commonly known as: KENALOG? I tend to break out with an occasional rash and my former Doctor in the [place] prescribed this for me.  Marquez

## 2020-12-14 RX ORDER — METHYLPREDNISOLONE 4 MG/1
TABLET ORAL
Qty: 21 TAB | Refills: 0 | Status: SHIPPED | OUTPATIENT
Start: 2020-12-14 | End: 2021-06-28

## 2021-01-18 RX ORDER — METHYLPREDNISOLONE 4 MG/1
TABLET ORAL
Qty: 21 TAB | Refills: 0 | Status: SHIPPED | OUTPATIENT
Start: 2021-01-18 | End: 2021-02-01

## 2021-02-01 DIAGNOSIS — M25.50 ARTHRALGIA, UNSPECIFIED JOINT: ICD-10-CM

## 2021-02-01 RX ORDER — METHYLPREDNISOLONE 4 MG/1
TABLET ORAL
Qty: 21 TAB | Refills: 0 | Status: SHIPPED | OUTPATIENT
Start: 2021-02-01 | End: 2021-02-22

## 2021-02-22 DIAGNOSIS — M25.50 ARTHRALGIA, UNSPECIFIED JOINT: ICD-10-CM

## 2021-02-22 RX ORDER — METHYLPREDNISOLONE 4 MG/1
TABLET ORAL
Qty: 1 TABLET | Refills: 0 | Status: SHIPPED | OUTPATIENT
Start: 2021-02-22 | End: 2021-03-15

## 2021-03-15 DIAGNOSIS — M25.50 ARTHRALGIA, UNSPECIFIED JOINT: ICD-10-CM

## 2021-03-15 RX ORDER — METHYLPREDNISOLONE 4 MG/1
TABLET ORAL
Qty: 21 TABLET | Refills: 0 | Status: SHIPPED | OUTPATIENT
Start: 2021-03-15 | End: 2021-04-15

## 2021-04-15 DIAGNOSIS — M25.50 ARTHRALGIA, UNSPECIFIED JOINT: ICD-10-CM

## 2021-04-15 RX ORDER — METHYLPREDNISOLONE 4 MG/1
TABLET ORAL
Qty: 21 TABLET | Refills: 0 | Status: SHIPPED | OUTPATIENT
Start: 2021-04-15 | End: 2021-05-13

## 2021-05-13 DIAGNOSIS — M25.50 ARTHRALGIA, UNSPECIFIED JOINT: ICD-10-CM

## 2021-05-13 RX ORDER — METHYLPREDNISOLONE 4 MG/1
TABLET ORAL
Qty: 21 EACH | Refills: 0 | Status: SHIPPED | OUTPATIENT
Start: 2021-05-13 | End: 2021-06-17

## 2021-06-17 DIAGNOSIS — M25.50 ARTHRALGIA, UNSPECIFIED JOINT: ICD-10-CM

## 2021-06-17 RX ORDER — METHYLPREDNISOLONE 4 MG/1
TABLET ORAL
Qty: 21 TABLET | Refills: 0 | Status: SHIPPED | OUTPATIENT
Start: 2021-06-17 | End: 2021-07-20

## 2021-06-28 ENCOUNTER — OFFICE VISIT (OUTPATIENT)
Dept: MEDICAL GROUP | Facility: PHYSICIAN GROUP | Age: 71
End: 2021-06-28
Payer: MEDICARE

## 2021-06-28 VITALS
DIASTOLIC BLOOD PRESSURE: 86 MMHG | SYSTOLIC BLOOD PRESSURE: 138 MMHG | HEIGHT: 69 IN | BODY MASS INDEX: 27.55 KG/M2 | TEMPERATURE: 96.5 F | WEIGHT: 186 LBS | OXYGEN SATURATION: 96 % | HEART RATE: 54 BPM | RESPIRATION RATE: 16 BRPM

## 2021-06-28 DIAGNOSIS — Z12.12 SCREENING FOR COLORECTAL CANCER: ICD-10-CM

## 2021-06-28 DIAGNOSIS — Z00.00 MEDICARE ANNUAL WELLNESS VISIT, SUBSEQUENT: ICD-10-CM

## 2021-06-28 DIAGNOSIS — R79.89 ELEVATED LFTS: ICD-10-CM

## 2021-06-28 DIAGNOSIS — Z12.11 SCREENING FOR COLORECTAL CANCER: ICD-10-CM

## 2021-06-28 DIAGNOSIS — E78.5 HYPERLIPIDEMIA LDL GOAL <100: ICD-10-CM

## 2021-06-28 DIAGNOSIS — Z23 NEED FOR VACCINATION: ICD-10-CM

## 2021-06-28 DIAGNOSIS — N18.30 CRI (CHRONIC RENAL INSUFFICIENCY), STAGE 3 (MODERATE): ICD-10-CM

## 2021-06-28 DIAGNOSIS — M25.50 ARTHRALGIA, UNSPECIFIED JOINT: ICD-10-CM

## 2021-06-28 PROCEDURE — 90750 HZV VACC RECOMBINANT IM: CPT | Performed by: FAMILY MEDICINE

## 2021-06-28 PROCEDURE — G0439 PPPS, SUBSEQ VISIT: HCPCS | Performed by: FAMILY MEDICINE

## 2021-06-28 PROCEDURE — 90471 IMMUNIZATION ADMIN: CPT | Performed by: FAMILY MEDICINE

## 2021-06-28 ASSESSMENT — ENCOUNTER SYMPTOMS: GENERAL WELL-BEING: GOOD

## 2021-06-28 ASSESSMENT — PATIENT HEALTH QUESTIONNAIRE - PHQ9: CLINICAL INTERPRETATION OF PHQ2 SCORE: 0

## 2021-06-28 ASSESSMENT — FIBROSIS 4 INDEX: FIB4 SCORE: 1.18

## 2021-06-28 ASSESSMENT — ACTIVITIES OF DAILY LIVING (ADL): BATHING_REQUIRES_ASSISTANCE: 0

## 2021-06-28 NOTE — PROGRESS NOTES
Chief Complaint   Patient presents with   • Annual Exam         HPI:  Marquez is a 70 y.o. here for Medicare Annual Wellness Visit        Patient Active Problem List    Diagnosis Date Noted   • Allergy to statin medication 03/17/2020   • Recurrent sinusitis 03/20/2018   • GERD (gastroesophageal reflux disease) 03/20/2018   • Hyperlipidemia LDL goal <100 03/20/2018       Current Outpatient Medications   Medication Sig Dispense Refill   • methylPREDNISolone (MEDROL DOSEPAK) 4 MG Tablet Therapy Pack TAKE 6 TABLETS ON DAY 1 AS DIRECTED ON PACKAGE AND DECREASE BY 1 TAB EACH DAY FOR A TOTAL OF 6 DAYS 21 tablet 0   • triamcinolone acetonide (KENALOG) 0.1 % Ointment Apply 1 g to affected area(s) 2 times a day. PRN 60 g 5   • VITAMIN E PO Take 1 Cap by mouth every day.     • multivitamin (THERAGRAN) Tab Take 1 Tab by mouth every day.     • methylPREDNISolone (MEDROL DOSEPAK) 4 MG Tablet Therapy Pack As directed on the packaging label. (Patient not taking: Reported on 6/28/2021) 21 Tab 0   • acetaminophen (TYLENOL) 500 MG Tab Take 2 Tabs by mouth every 6 hours. (Patient not taking: Reported on 6/28/2021) 30 Tab 0   • fluticasone (FLONASE) 50 MCG/ACT nasal spray Spray 1 Spray in nose as needed. Indications: Signs and Symptoms of Nose Diseases (Patient not taking: Reported on 6/28/2021)     • Saw Palmetto, Serenoa repens, (SAW PALMETTO PO) Take 1 Cap by mouth every day. (Patient not taking: Reported on 6/28/2021)     • aspirin (ASA) 325 MG Tab Take 650 mg by mouth every 6 hours as needed for Inflammation. (Patient not taking: Reported on 6/28/2021)     • Lansoprazole (PREVACID PO) Take 1 Tab by mouth every day.       No current facility-administered medications for this visit.        Patient is taking medications as noted in medication list.  Current supplements as per medication list.     Allergies: Lipitor [atorvastatin] and Statins [hmg-coa-r inhibitors]    Current social contact/activities:      Is patient current with  immunizations? Yes.    He  reports that he has never smoked. He has never used smokeless tobacco. He reports previous alcohol use. He reports current drug use. Drug: Marijuana.  Counseling given: Not Answered        DPA/Advanced directive: Patient does not have an Advanced Directive.  A packet and workshop information was given on Advanced Directives.    ROS:    Gait: Uses no assistive device   Ostomy: No   Other tubes: No   Amputations: No   Chronic oxygen use No  Last eye exam 2019   Wears hearing aids: No   : Denies any urinary leakage during the last 6 months      Screening:        Depression Screening    Little interest or pleasure in doing things?  0 - not at all  Feeling down, depressed, or hopeless? 0 - not at all  Patient Health Questionnaire Score: 0    If depressive symptoms identified deferred to follow up visit unless specifically addressed in assessment and plan.    Interpretation of PHQ-9 Total Score   Score Severity   1-4 No Depression   5-9 Mild Depression   10-14 Moderate Depression   15-19 Moderately Severe Depression   20-27 Severe Depression    Screening for Cognitive Impairment    Three Minute Recall (captain, garden, picture)  2/3    Isidro clock face with all 12 numbers and set the hands to show 5 past 8.  Yes    If cognitive concerns identified, deferred for follow up unless specifically addressed in assessment and plan.    Fall Risk Assessment    Has the patient had two or more falls in the last year or any fall with injury in the last year?  No  If fall risk identified, deferred for follow up unless specifically addressed in assessment and plan.    Safety Assessment    Throw rugs on floor.  No  Handrails on all stairs.  No  Good lighting in all hallways.  Yes  Difficulty hearing.  No  Patient counseled about all safety risks that were identified.    Functional Assessment ADLs    Are there any barriers preventing you from cooking for yourself or meeting nutritional needs?  No.    Are there  any barriers preventing you from driving safely or obtaining transportation?  No.    Are there any barriers preventing you from using a telephone or calling for help?  No.    Are there any barriers preventing you from shopping?  No.    Are there any barriers preventing you from taking care of your own finances?  No.    Are there any barriers preventing you from managing your medications?  No.    Are there any barriers preventing you from showering, bathing or dressing yourself?  No.    Are you currently engaging in any exercise or physical activity?  Yes.     What is your perception of your health?  Good.    Health Maintenance Summary                Annual Wellness Visit Overdue 1950     COVID-19 Vaccine Overdue 10/17/1961     IMM ZOSTER VACCINES Overdue 11/10/2020      Done 9/15/2020 Imm Admin: Zoster Vaccine Recombinant (RZV) (SHINGRIX)     Patient has more history with this topic...    COLON CANCER SCREENING ANNUAL FIT Overdue 12/7/2020      Done 12/7/2019 OCCULT BLOOD FECES IMMUNOASSAY    IMM INFLUENZA Next Due 9/1/2021     IMM DTaP/Tdap/Td Vaccine Next Due 6/29/2025      Done 6/29/2015 Imm Admin: Tdap Vaccine          Patient Care Team:  Bob Gage M.D. as PCP - General (Family Medicine)  Aggie Blankenship ([facility] [place]) as Patient Advocate - [facility]    Social History     Tobacco Use   • Smoking status: Never Smoker   • Smokeless tobacco: Never Used   Vaping Use   • Vaping Use: Never used   Substance Use Topics   • Alcohol use: Not Currently   • Drug use: Yes     Types: Marijuana     Comment: occ marijuana     No family history on file.  He  has no past medical history on file.   Past Surgical History:   Procedure Laterality Date   • EDILBERTO BY LAPAROSCOPY N/A 2/24/2020    Procedure: CHOLECYSTECTOMY, LAPAROSCOPIC;  Surgeon: Catherine Morocho M.D.;  Location: SURGERY [facility];  Service: General           Exam:     /86 (BP Location: Left arm, Patient Position: Sitting, BP Cuff Size: Adult)    "Pulse (!) 54   Temp 35.8 °C (96.5 °F) (Temporal)   Resp 16   Ht 1.746 m (5' 8.75\")   Wt 84.4 kg (186 lb)   SpO2 96%  Body mass index is 27.67 kg/m².    Hearing good.    Dentition good  Alert, oriented in no acute distress.  Eye contact is good, speech goal directed, affect calm      Assessment and Plan. The following treatment and monitoring plan is recommended:    1. Medicare annual wellness visit, subsequent      2. Need for vaccination    - Shingles Vaccine (Shingrix)  - Comp Metabolic Panel; Future  - Lipid Profile; Future  - TRIIDOTHYRONINE; Future  - FREE THYROXINE; Future    3. Screening for colorectal cancer    - OCCULT BLOOD FECES IMMUNOASSAY (FIT); Future  - Comp Metabolic Panel; Future  - Lipid Profile; Future  - TRIIDOTHYRONINE; Future  - FREE THYROXINE; Future    4. Hyperlipidemia LDL goal <100    - Comp Metabolic Panel; Future  - Lipid Profile; Future  - TRIIDOTHYRONINE; Future  - FREE THYROXINE; Future    5. Arthralgia, unspecified joint  Improved, just in hands now, has appt with rheum     6. Elevated LFTs  Will check labs again3    7. CRI (chronic renal insufficiency), stage 3 (moderate) (HCC)  Patient is currently being followed for chronic renal insufficiency. They are avoiding nsaids and maintaining hydration and following renal diet. Taking all appropriate meds. No new change in urine frequency or volume.      History reviewed. No pertinent past medical history.  Past Surgical History:   Procedure Laterality Date   • EDILBERTO BY LAPAROSCOPY N/A 2/24/2020    Procedure: CHOLECYSTECTOMY, LAPAROSCOPIC;  Surgeon: Catherine Morocho M.D.;  Location: SURGERY [facility];  Service: General     Social History     Tobacco Use   • Smoking status: Never Smoker   • Smokeless tobacco: Never Used   Vaping Use   • Vaping Use: Never used   Substance Use Topics   • Alcohol use: Not Currently   • Drug use: Yes     Types: Marijuana     Comment: occ marijuana     History reviewed. No pertinent family " history.      Current Outpatient Medications:   •  methylPREDNISolone (MEDROL DOSEPAK) 4 MG Tablet Therapy Pack, TAKE 6 TABLETS ON DAY 1 AS DIRECTED ON PACKAGE AND DECREASE BY 1 TAB EACH DAY FOR A TOTAL OF 6 DAYS, Disp: 21 tablet, Rfl: 0  •  triamcinolone acetonide (KENALOG) 0.1 % Ointment, Apply 1 g to affected area(s) 2 times a day. PRN, Disp: 60 g, Rfl: 5  •  VITAMIN E PO, Take 1 Cap by mouth every day., Disp: , Rfl:   •  multivitamin (THERAGRAN) Tab, Take 1 Tab by mouth every day., Disp: , Rfl:     Patient was instructed on the use of medications, either prescriptions or OTC and informed on when the appropriate follow up time period should be. In addition, patient was also instructed that should any acute worsening occur that they should notify this clinic asap or call 911.          Services suggested: No services needed at this time  Health Care Screening recommendations as per orders if indicated.  Referrals offered: PT/OT/Nutrition counseling/Behavioral Health/Smoking cessation as per orders if indicated.    Discussion today about general wellness and lifestyle habits:    · Prevent falls and reduce trip hazards; Cautioned about securing or removing rugs.  · Have a working fire alarm and carbon monoxide detector;   · Engage in regular physical activity and social activities       Follow-up: No follow-ups on file.    Annual Health Assessment Questions:    1.  Are you currently engaging in any exercise or physical activity? Yes    2.  How would you describe your mood or emotional well-being today? fair    3.  Have you had any falls in the last year? No    4.  Have you noticed any problems with your balance or had difficulty walking? No    5.  In the last six months have you experienced any leakage of urine? No    6. DPA/Advanced Directive: Patient does not have an Advanced Directive.  A packet and workshop information was given on Advanced Directives.

## 2021-07-01 ENCOUNTER — OFFICE VISIT (OUTPATIENT)
Dept: URGENT CARE | Facility: CLINIC | Age: 71
End: 2021-07-01
Payer: MEDICARE

## 2021-07-01 VITALS
BODY MASS INDEX: 27.36 KG/M2 | SYSTOLIC BLOOD PRESSURE: 146 MMHG | HEIGHT: 69 IN | RESPIRATION RATE: 16 BRPM | OXYGEN SATURATION: 95 % | WEIGHT: 184.7 LBS | DIASTOLIC BLOOD PRESSURE: 90 MMHG | HEART RATE: 61 BPM | TEMPERATURE: 98.1 F

## 2021-07-01 DIAGNOSIS — J32.9 SINUSITIS, UNSPECIFIED CHRONICITY, UNSPECIFIED LOCATION: ICD-10-CM

## 2021-07-01 DIAGNOSIS — J30.89 ALLERGIC RHINITIS DUE TO OTHER ALLERGIC TRIGGER, UNSPECIFIED SEASONALITY: ICD-10-CM

## 2021-07-01 PROBLEM — Z91.199 PATIENT'S NONCOMPLIANCE WITH OTHER MEDICAL TREATMENT AND REGIMEN: Status: ACTIVE | Noted: 2017-08-30

## 2021-07-01 PROCEDURE — 99214 OFFICE O/P EST MOD 30 MIN: CPT | Performed by: FAMILY MEDICINE

## 2021-07-01 RX ORDER — CETIRIZINE HYDROCHLORIDE 10 MG/1
10 TABLET ORAL DAILY
Qty: 30 NOT SPECIFIED | Refills: 1 | Status: SHIPPED | OUTPATIENT
Start: 2021-07-01 | End: 2021-12-21

## 2021-07-01 ASSESSMENT — ENCOUNTER SYMPTOMS
DIZZINESS: 0
CHILLS: 0
COUGH: 0
FEVER: 0
SHORTNESS OF BREATH: 0
MYALGIAS: 0
VOMITING: 0
NAUSEA: 0
SINUS PRESSURE: 0
SORE THROAT: 1

## 2021-07-01 ASSESSMENT — FIBROSIS 4 INDEX: FIB4 SCORE: 1.18

## 2021-07-01 NOTE — PATIENT INSTRUCTIONS
Allergic Rhinitis, Adult  Allergic rhinitis is a reaction to allergens in the air. Allergens are tiny specks (particles) in the air that cause your body to have an allergic reaction. This condition cannot be passed from person to person (is not contagious). Allergic rhinitis cannot be cured, but it can be controlled.  There are two types of allergic rhinitis:  · Seasonal. This type is also called hay fever. It happens only during certain times of the year.  · Perennial. This type can happen at any time of the year.  What are the causes?  This condition may be caused by:  · Pollen from grasses, trees, and weeds.  · House dust mites.  · Pet dander.  · Mold.  What are the signs or symptoms?  Symptoms of this condition include:  · Sneezing.  · Runny or stuffy nose (nasal congestion).  · A lot of mucus in the back of the throat (postnasal drip).  · Itchy nose.  · Tearing of the eyes.  · Trouble sleeping.  · Being sleepy during day.  How is this treated?  There is no cure for this condition. You should avoid things that trigger your symptoms (allergens). Treatment can help to relieve symptoms. This may include:  · Medicines that block allergy symptoms, such as antihistamines. These may be given as a shot, nasal spray, or pill.  · Shots that are given until your body becomes less sensitive to the allergen (desensitization).  · Stronger medicines, if all other treatments have not worked.  Follow these instructions at home:  Avoiding allergens    · Find out what you are allergic to. Common allergens include smoke, dust, and pollen.  · Avoid them if you can. These are some of the things that you can do to avoid allergens:  ? Replace carpet with wood, tile, or vinyl carson. Carpet can trap dander and dust.  ? Clean any mold found in the home.  ? Do not smoke. Do not allow smoking in your home.  ? Change your heating and air conditioning filter at least once a month.  ? During allergy season:  § Keep windows closed as much as  you can. If possible, use air conditioning when there is a lot of pollen in the air.  § Use a special filter for allergies with your furnace and air conditioner.  § Plan outdoor activities when pollen counts are lowest. This is usually during the early morning or evening hours.  § If you do go outdoors when pollen count is high, wear a special mask for people with allergies.  § When you come indoors, take a shower and change your clothes before sitting on furniture or bedding.  General instructions  · Do not use fans in your home.  · Do not hang clothes outside to dry.  · Wear sunglasses to keep pollen out of your eyes.  · Wash your hands right away after you touch household pets.  · Take over-the-counter and prescription medicines only as told by your doctor.  · Keep all follow-up visits as told by your doctor. This is important.  Contact a doctor if:  · You have a fever.  · You have a cough that does not go away (is persistent).  · You start to make whistling sounds when you breathe (wheeze).  · Your symptoms do not get better with treatment.  · You have thick fluid coming from your nose.  · You start to have nosebleeds.  Get help right away if:  · Your tongue or your lips are swollen.  · You have trouble breathing.  · You feel dizzy or you feel like you are going to pass out (faint).  · You have cold sweats.  Summary  · Allergic rhinitis is a reaction to allergens in the air.  · This condition may be caused by allergens. These include pollen, dust mites, pet dander, and mold.  · Symptoms include a runny, itchy nose, sneezing, or tearing eyes. You may also have trouble sleeping or feel sleepy during the day.  · Treatment includes taking medicines and avoiding allergens. You may also get shots or take stronger medicines.  · Get help if you have a fever or a cough that does not stop. Get help right away if you are short of breath.  This information is not intended to replace advice given to you by your health care  provider. Make sure you discuss any questions you have with your health care provider.  Document Released: 04/18/2012 Document Revised: 04/07/2020 Document Reviewed: 07/09/2019  Elsevier Patient Education © 2020 [employer]

## 2021-07-01 NOTE — PROGRESS NOTES
Subjective:   Marquez Crawford is a 70 y.o. male who presents for Sinus Problem (sore throat, runny nose, mild cough x 3 days; got zooster vaccine on monday )        Sinus Problem  This is a new problem. Episode onset: 3 days. The problem is unchanged. There has been no fever. Associated symptoms include congestion and a sore throat. Pertinent negatives include no chills, coughing, shortness of breath or sinus pressure. (The patient reports recently receiving Shingrix vaccination    There has been community wide allergen and pollen exposure) Treatments tried: Allegra. The treatment provided mild relief.     PMH:  has no past medical history on file.  MEDS:   Current Outpatient Medications:   •  cetirizine (ZYRTEC) 10 MG Tab, Take 1 tablet by mouth every day., Disp: 30 Not Specified, Rfl: 1  •  methylPREDNISolone (MEDROL DOSEPAK) 4 MG Tablet Therapy Pack, TAKE 6 TABLETS ON DAY 1 AS DIRECTED ON PACKAGE AND DECREASE BY 1 TAB EACH DAY FOR A TOTAL OF 6 DAYS, Disp: 21 tablet, Rfl: 0  •  triamcinolone acetonide (KENALOG) 0.1 % Ointment, Apply 1 g to affected area(s) 2 times a day. PRN, Disp: 60 g, Rfl: 5  •  VITAMIN E PO, Take 1 Cap by mouth every day., Disp: , Rfl:   •  multivitamin (THERAGRAN) Tab, Take 1 Tab by mouth every day., Disp: , Rfl:   ALLERGIES:   Allergies   Allergen Reactions   • Lipitor [Atorvastatin] Myalgia   • Statins [Hmg-Coa-R Inhibitors]      Blister and the severe joint problem     SURGHX:   Past Surgical History:   Procedure Laterality Date   • EDILBERTO BY LAPAROSCOPY N/A 2/24/2020    Procedure: CHOLECYSTECTOMY, LAPAROSCOPIC;  Surgeon: Catherine Morocho M.D.;  Location: SURGERY [facility];  Service: General     SOCHX:  reports that he has never smoked. He has never used smokeless tobacco. He reports previous alcohol use. He reports current drug use. Drug: Marijuana.  FH: History reviewed. No pertinent family history.  Review of Systems   Constitutional: Negative for chills and fever.  "  HENT: Positive for congestion and sore throat. Negative for sinus pressure.    Respiratory: Negative for cough and shortness of breath.    Gastrointestinal: Negative for nausea and vomiting.   Musculoskeletal: Negative for myalgias.   Skin: Negative for rash.   Neurological: Negative for dizziness.        Objective:   /90 (BP Location: Right arm, Patient Position: Sitting)   Pulse 61   Temp 36.7 °C (98.1 °F) (Temporal)   Resp 16   Ht 1.746 m (5' 8.75\")   Wt 83.8 kg (184 lb 11.2 oz)   SpO2 95%   BMI 27.47 kg/m²   Physical Exam  Vitals and nursing note reviewed.   Constitutional:       General: He is not in acute distress.     Appearance: He is well-developed.   HENT:      Head: Normocephalic and atraumatic.      Right Ear: External ear normal.      Left Ear: External ear normal.      Nose: Mucosal edema present.      Right Turbinates: Swollen.      Left Turbinates: Swollen.      Comments: Turbinates edematous     Mouth/Throat:      Mouth: Mucous membranes are moist.      Pharynx: Posterior oropharyngeal erythema (posterior pharyngeal drainage and erythema) present.   Eyes:      Conjunctiva/sclera: Conjunctivae normal.   Cardiovascular:      Rate and Rhythm: Normal rate.   Pulmonary:      Effort: Pulmonary effort is normal. No respiratory distress.      Breath sounds: Normal breath sounds.   Abdominal:      General: There is no distension.   Musculoskeletal:         General: Normal range of motion.   Skin:     General: Skin is warm and dry.   Neurological:      General: No focal deficit present.      Mental Status: He is alert and oriented to person, place, and time. Mental status is at baseline.      Gait: Gait (gait at baseline) normal.   Psychiatric:         Judgment: Judgment normal.           Assessment/Plan:   1. Allergic rhinitis due to other allergic trigger, unspecified seasonality  - cetirizine (ZYRTEC) 10 MG Tab; Take 1 tablet by mouth every day.  Dispense: 30 Not Specified; Refill: 1    2. " Sinusitis, unspecified chronicity, unspecified location  - cetirizine (ZYRTEC) 10 MG Tab; Take 1 tablet by mouth every day.  Dispense: 30 Not Specified; Refill: 1        Medical Decision Making/Course:  In the course of preparing for this visit with review of the pertinent past medical history, recent and past clinic visits, current medications, and performing chart, immunization, medical history and medication reconciliation, and in the further course of obtaining the current history pertinent to the clinic visit today, performing an exam and evaluation, ordering and independently evaluating labs, tests, and/or procedures, prescribing any recommended new medications as noted above, providing any pertinent counseling and education and recommending further coordination of care, at least 20 minutes of total time were spent during this encounter.      Discussed close monitoring, return precautions, and supportive measures of maintaining adequate fluid hydration and caloric intake, relative rest and symptom management as needed for pain and/or fever.    Differential diagnosis, natural history, supportive care, and indications for immediate follow-up discussed.     Advised the patient to follow-up with the primary care physician for recheck, reevaluation, and consideration of further management.    Please note that this dictation was created using voice recognition software. I have worked with consultants from the vendor as well as technical experts from [employer] to optimize the interface. I have made every reasonable attempt to correct obvious errors, but I expect that there are errors of grammar and possibly content that I did not discover before finalizing the note.

## 2021-07-19 DIAGNOSIS — M25.50 ARTHRALGIA, UNSPECIFIED JOINT: ICD-10-CM

## 2021-07-20 RX ORDER — METHYLPREDNISOLONE 4 MG/1
TABLET ORAL
Qty: 21 TABLET | Refills: 0 | Status: SHIPPED | OUTPATIENT
Start: 2021-07-20 | End: 2021-12-21

## 2021-08-11 ENCOUNTER — PATIENT MESSAGE (OUTPATIENT)
Dept: HEALTH INFORMATION MANAGEMENT | Facility: OTHER | Age: 71
End: 2021-08-11

## 2021-09-28 ENCOUNTER — HOSPITAL ENCOUNTER (OUTPATIENT)
Facility: MEDICAL CENTER | Age: 71
End: 2021-09-28
Attending: FAMILY MEDICINE
Payer: MEDICARE

## 2021-09-28 PROCEDURE — 82274 ASSAY TEST FOR BLOOD FECAL: CPT

## 2021-10-05 DIAGNOSIS — Z12.11 SCREENING FOR COLORECTAL CANCER: ICD-10-CM

## 2021-10-05 DIAGNOSIS — Z12.12 SCREENING FOR COLORECTAL CANCER: ICD-10-CM

## 2021-10-07 LAB — IMM ASSAY OCC BLD FITOB: NEGATIVE

## 2021-12-21 ENCOUNTER — OFFICE VISIT (OUTPATIENT)
Dept: MEDICAL GROUP | Facility: PHYSICIAN GROUP | Age: 71
End: 2021-12-21
Payer: MEDICARE

## 2021-12-21 VITALS
HEART RATE: 94 BPM | BODY MASS INDEX: 27.19 KG/M2 | HEIGHT: 69 IN | DIASTOLIC BLOOD PRESSURE: 78 MMHG | OXYGEN SATURATION: 99 % | TEMPERATURE: 101.7 F | WEIGHT: 183.6 LBS | SYSTOLIC BLOOD PRESSURE: 104 MMHG | RESPIRATION RATE: 16 BRPM

## 2021-12-21 DIAGNOSIS — A09 DIARRHEA OF INFECTIOUS ORIGIN: ICD-10-CM

## 2021-12-21 DIAGNOSIS — U07.1 COVID-19: ICD-10-CM

## 2021-12-21 LAB
EXTERNAL QUALITY CONTROL: NORMAL
SARS-COV+SARS-COV-2 AG RESP QL IA.RAPID: POSITIVE

## 2021-12-21 PROCEDURE — 87426 SARSCOV CORONAVIRUS AG IA: CPT | Performed by: PHYSICIAN ASSISTANT

## 2021-12-21 PROCEDURE — 99214 OFFICE O/P EST MOD 30 MIN: CPT | Mod: CS | Performed by: PHYSICIAN ASSISTANT

## 2021-12-21 ASSESSMENT — FIBROSIS 4 INDEX: FIB4 SCORE: 1.19

## 2021-12-22 NOTE — PROGRESS NOTES
CC:  Chief Complaint   Patient presents with   • Diarrhea     x8day   • Fever     x8days   • Body Aches     x8day       HISTORY OF PRESENT ILLNESS: Patient is a 71 y.o. male established patient presenting with feeling sick 8 days ago. He is getting on and off fevers. He is febrile in office today. He is breathing fine. He is feeling a little shooting pain through his joints. Has generalized abd pain. Was having diarrhea previously but in recent days has been just once in the AM. No blood in stool. His daughter and grandkids are sick too but much more mild sxs. Pt is not vaccinated for COVID.       No problem-specific Assessment & Plan notes found for this encounter.      Patient Active Problem List    Diagnosis Date Noted   • Allergy to statin medication 03/17/2020   • Recurrent sinusitis 03/20/2018   • GERD (gastroesophageal reflux disease) 03/20/2018   • Hyperlipidemia LDL goal <100 03/20/2018   • Patient's noncompliance with other medical treatment and regimen 08/30/2017      Allergies:Lipitor [atorvastatin] and Statins [hmg-coa-r inhibitors]    Current Outpatient Medications   Medication Sig Dispense Refill   • DM-Doxylamine-Acetaminophen (NIGHT TIME COLD/FLU RELIEF PO) Take  by mouth.     • VITAMIN E PO Take 1 Cap by mouth every day.       No current facility-administered medications for this visit.       Social History     Tobacco Use   • Smoking status: Never Smoker   • Smokeless tobacco: Never Used   Vaping Use   • Vaping Use: Never used   Substance Use Topics   • Alcohol use: Not Currently   • Drug use: Yes     Types: Marijuana     Comment: occ marijuana     Social History     Social History Narrative   • Not on file       History reviewed. No pertinent family history.     ROS:     - Constitutional: Positive for fevers, chills. Negative for  unexpected weight change, and fatigue/generalized weakness.    - HEENT:  Negative for headaches, vision changes, hearing changes, ear pain, ear discharge, rhinorrhea,  "sinus congestion, sore throat, and neck pain.      - Respiratory: Negative for cough, sputum production, chest congestion, dyspnea, wheezing, and crackles.      - Cardiovascular: Negative for chest pain, palpitations, orthopnea, and bilateral lower extremity edema.     - Gastrointestinal:Positive for abd pain and diarrhea. Negative for heartburn, nausea, vomiting, , hematochezia, melena,  constipation, and greasy/foul-smelling stools.        Exam:    /78   Pulse 94   Temp (!) 38.7 °C (101.7 °F) (Temporal)   Resp 16   Ht 1.753 m (5' 9\")   Wt 83.3 kg (183 lb 9.6 oz)   SpO2 99%  Body mass index is 27.11 kg/m².    General:  Well nourished, well developed male in NAD  Head is grossly normal.  Neck: Supple. Thyroid is not enlarged.  Pulmonary: Clear to auscultation. No ronchi, wheezing or rales  Cardiac: Regular rate and rhythm. No murmurs.  Abdomen: soft, nondistender, nontender.  Skin: Warm and dry. No obvious lesions  Neuro: Normal muscle tone. Gait normal. Alert and oriented.  Psych: Normal mood and affect      Please note that this dictation was created using voice recognition software. I have made every reasonable attempt to correct obvious errors, but I expect that there are errors of grammar and possibly content that I did not discover before finalizing the note.    LABS: 12/21/2021: Results reviewed and discussed with the patient, questions answered.    Assessment/Plan:    1. Diarrhea of infectious origin  Test is positive.   - POCT SARS-COV Antigen JOAN (Symptomatic Only)    2. COVID-19  I have provided the patient with the \"fact sheet for patients and parents/caregivers\".   I informed them of therapeutic alternatives to Casirivimab & Imdevimab and the risks and benefits of those alternatives.   I informed them that they have the option to accept or refuse Casirivimab & Imdevimab.   I informed them that Casirivimab & Imdevimab is not FDA approved, but that it is authorized for use under emergency by " the FDA.   I informed them of the known risks and benefits of Casirivimab & Imdevimab and discussed the extent to which such risks and benefits are unknown.   The patient consents to undergoing the Regeneron injections at [facility].

## 2022-05-30 ENCOUNTER — OFFICE VISIT (OUTPATIENT)
Dept: URGENT CARE | Facility: CLINIC | Age: 72
End: 2022-05-30
Payer: MEDICARE

## 2022-05-30 VITALS
WEIGHT: 185.8 LBS | BODY MASS INDEX: 27.52 KG/M2 | HEART RATE: 65 BPM | DIASTOLIC BLOOD PRESSURE: 96 MMHG | RESPIRATION RATE: 16 BRPM | SYSTOLIC BLOOD PRESSURE: 134 MMHG | TEMPERATURE: 98.3 F | HEIGHT: 69 IN | OXYGEN SATURATION: 96 %

## 2022-05-30 DIAGNOSIS — H66.001 NON-RECURRENT ACUTE SUPPURATIVE OTITIS MEDIA OF RIGHT EAR WITHOUT SPONTANEOUS RUPTURE OF TYMPANIC MEMBRANE: ICD-10-CM

## 2022-05-30 DIAGNOSIS — H92.01 OTALGIA, RIGHT: ICD-10-CM

## 2022-05-30 PROCEDURE — 99213 OFFICE O/P EST LOW 20 MIN: CPT | Performed by: NURSE PRACTITIONER

## 2022-05-30 RX ORDER — AMOXICILLIN AND CLAVULANATE POTASSIUM 875; 125 MG/1; MG/1
1 TABLET, FILM COATED ORAL 2 TIMES DAILY
Qty: 14 TABLET | Refills: 0 | Status: SHIPPED | OUTPATIENT
Start: 2022-05-30 | End: 2022-06-06

## 2022-05-30 ASSESSMENT — FIBROSIS 4 INDEX: FIB4 SCORE: 1.19

## 2022-05-30 ASSESSMENT — ENCOUNTER SYMPTOMS
CONSTITUTIONAL NEGATIVE: 1
FEVER: 0
SORE THROAT: 0
RESPIRATORY NEGATIVE: 1
NECK PAIN: 1
COUGH: 0
CHILLS: 0

## 2022-05-30 ASSESSMENT — VISUAL ACUITY: OU: 1

## 2022-05-30 NOTE — PROGRESS NOTES
Subjective:     Marquez Crawford is a 71 y.o. male who presents for Otalgia ((R) ear shooting pain radiates down to neck X 3 days )       Otalgia   There is pain in the right ear. This is a new problem. Episode onset: Friday. The problem has been gradually worsening. Associated symptoms include neck pain. Pertinent negatives include no coughing or sore throat.      Wears hearing aids.    Patient was screened prior to rooming and denied COVID-19 diagnosis or contact with a person who has been diagnosed or is suspected to have COVID-19. During this visit, appropriate PPE was worn, hand hygiene was performed, and the patient and any visitors were masked.     PMH:  has no past medical history on file.    MEDS:   Current Outpatient Medications:   •  amoxicillin-clavulanate (AUGMENTIN) 875-125 MG Tab, Take 1 Tablet by mouth 2 times a day for 7 days., Disp: 14 Tablet, Rfl: 0  •  VITAMIN E PO, Take 1 Cap by mouth every day., Disp: , Rfl:   •  DM-Doxylamine-Acetaminophen (NIGHT TIME COLD/FLU RELIEF PO), Take  by mouth. (Patient not taking: Reported on 5/30/2022), Disp: , Rfl:     ALLERGIES:   Allergies   Allergen Reactions   • Lipitor [Atorvastatin] Myalgia   • Statins [Hmg-Coa-R Inhibitors]      Blister and the severe joint problem     SURGHX:   Past Surgical History:   Procedure Laterality Date   • EDILBERTO BY LAPAROSCOPY N/A 2/24/2020    Procedure: CHOLECYSTECTOMY, LAPAROSCOPIC;  Surgeon: Catherine Morocho M.D.;  Location: SURGERY [facility];  Service: General     SOCHX:  reports that he has never smoked. He has never used smokeless tobacco. He reports previous alcohol use. He reports current drug use. Drug: Marijuana.     FH: Reviewed with patient, not pertinent to this visit.    Review of Systems   Constitutional: Negative.  Negative for chills, fever and malaise/fatigue.   HENT: Positive for ear pain (Right). Negative for congestion and sore throat.    Respiratory: Negative.  Negative for cough.    Musculoskeletal:  "Positive for neck pain.   All other systems reviewed and are negative.    Additional details per HPI.      Objective:     BP (!) 134/96 (BP Location: Right arm, Patient Position: Sitting)   Pulse 65   Temp 36.8 °C (98.3 °F) (Temporal)   Resp 16   Ht 1.753 m (5' 9\")   Wt 84.3 kg (185 lb 12.8 oz)   SpO2 96%   BMI 27.44 kg/m²     Physical Exam  Vitals reviewed.   Constitutional:       General: He is not in acute distress.     Appearance: He is well-developed. He is not ill-appearing or toxic-appearing.   HENT:      Right Ear: Tympanic membrane is injected and bulging. Tympanic membrane is not perforated.      Left Ear: Tympanic membrane is not injected, perforated or bulging.   Eyes:      General: Vision grossly intact.      Extraocular Movements: Extraocular movements intact.   Cardiovascular:      Rate and Rhythm: Normal rate.   Pulmonary:      Effort: Pulmonary effort is normal. No respiratory distress.   Musculoskeletal:         General: No deformity. Normal range of motion.      Cervical back: Normal range of motion.   Skin:     General: Skin is warm and dry.      Coloration: Skin is not pale.   Neurological:      Mental Status: He is alert and oriented to person, place, and time.      Sensory: No sensory deficit.      Motor: No weakness.   Psychiatric:         Behavior: Behavior normal. Behavior is cooperative.       Assessment/Plan:     1. Otalgia, right    2. Non-recurrent acute suppurative otitis media of right ear without spontaneous rupture of tympanic membrane  - amoxicillin-clavulanate (AUGMENTIN) 875-125 MG Tab; Take 1 Tablet by mouth 2 times a day for 7 days.  Dispense: 14 Tablet; Refill: 0    Rx as above sent electronically.     Differential diagnosis, natural history, supportive care, over-the-counter symptom management per 's instructions, ibuprofen, APAP, close monitoring, and indications for immediate follow-up discussed.     All questions answered. Patient agrees with the plan of " care.    Discharge summary provided via [employer].

## 2022-06-20 ENCOUNTER — OFFICE VISIT (OUTPATIENT)
Dept: MEDICAL GROUP | Facility: PHYSICIAN GROUP | Age: 72
End: 2022-06-20
Payer: MEDICARE

## 2022-06-20 VITALS
SYSTOLIC BLOOD PRESSURE: 138 MMHG | RESPIRATION RATE: 16 BRPM | OXYGEN SATURATION: 96 % | HEIGHT: 69 IN | BODY MASS INDEX: 27.61 KG/M2 | HEART RATE: 60 BPM | WEIGHT: 186.4 LBS | TEMPERATURE: 97.4 F | DIASTOLIC BLOOD PRESSURE: 82 MMHG

## 2022-06-20 DIAGNOSIS — M06.4 INFLAMMATORY POLYARTHRITIS (HCC): ICD-10-CM

## 2022-06-20 DIAGNOSIS — N18.31 STAGE 3A CHRONIC KIDNEY DISEASE: ICD-10-CM

## 2022-06-20 DIAGNOSIS — R79.9 ABNORMAL FINDING OF BLOOD CHEMISTRY, UNSPECIFIED: ICD-10-CM

## 2022-06-20 PROCEDURE — 99214 OFFICE O/P EST MOD 30 MIN: CPT | Performed by: FAMILY MEDICINE

## 2022-06-20 RX ORDER — OMEPRAZOLE 20 MG/1
20 CAPSULE, DELAYED RELEASE ORAL DAILY
COMMUNITY

## 2022-06-20 ASSESSMENT — ENCOUNTER SYMPTOMS
HEADACHES: 0
DOUBLE VISION: 0
NEUROLOGICAL NEGATIVE: 1
DIZZINESS: 0
MYALGIAS: 0
PSYCHIATRIC NEGATIVE: 1
TINGLING: 0
HEARTBURN: 0
RESPIRATORY NEGATIVE: 1
CARDIOVASCULAR NEGATIVE: 1
CONSTITUTIONAL NEGATIVE: 1
MUSCULOSKELETAL NEGATIVE: 1
CHILLS: 0
NAUSEA: 0
BLURRED VISION: 0
COUGH: 0
FEVER: 0
EYES NEGATIVE: 1
PALPITATIONS: 0
DEPRESSION: 0
GASTROINTESTINAL NEGATIVE: 1
BRUISES/BLEEDS EASILY: 0
HEMOPTYSIS: 0

## 2022-06-20 ASSESSMENT — FIBROSIS 4 INDEX: FIB4 SCORE: 1.19

## 2022-06-20 ASSESSMENT — PATIENT HEALTH QUESTIONNAIRE - PHQ9: CLINICAL INTERPRETATION OF PHQ2 SCORE: 0

## 2022-06-20 NOTE — PROGRESS NOTES
Annual Health Assessment Questions:    1.  Are you currently engaging in any exercise or physical activity? Yes    2.  How would you describe your mood or emotional well-being today? good    3.  Have you had any falls in the last year? No    4.  Have you noticed any problems with your balance or had difficulty walking? No    5.  In the last six months have you experienced any leakage of urine? No    6. DPA/Advanced Directive: yes

## 2022-06-20 NOTE — PROGRESS NOTES
Subjective     Marquez Crawford is a 71 y.o. male who presents with Other (Pulse 8/)            1. Inflammatory polyarthritis (HCC)  Resolved with no further sx, will continue to monitro   Comp Metabolic Panel; Future   Lipid Profile; Future    2. Stage 3a chronic kidney disease (HCC)  Patient is currently being followed for chronic renal insufficiency. They are avoiding nsaids and maintaining hydration and following renal diet. Taking all appropriate meds. No new change in urine frequency or volume.     Comp Metabolic Panel; Future   Lipid Profile; Future    3. Abnormal finding of blood chemistry, unspecified      Lipid Profile; Future    History reviewed. No pertinent past medical history.  Past Surgical History:  2/24/2020: EDILBERTO BY LAPAROSCOPY; N/A      Comment:  Procedure: CHOLECYSTECTOMY, LAPAROSCOPIC;  Surgeon:                Catherine Morocho M.D.;  Location: SURGERY [facility];  Service: General  Social History    Tobacco Use      Smoking status: Never Smoker      Smokeless tobacco: Never Used    Vaping Use      Vaping Use: Never used    Alcohol use: Not Currently    Drug use: Yes      Types: Marijuana      Comment: occ marijuana    History reviewed.  No pertinent family history.      Current Outpatient Medications: •  Multiple Vitamin (MULTIVITAMIN ADULT PO), Take  by mouth., Disp: , Rfl: •  omeprazole (PRILOSEC) 20 MG delayed-release capsule, Take 20 mg by mouth every day., Disp: , Rfl: •  VITAMIN E PO, Take 1 Cap by mouth every day., Disp: , Rfl:     Patient was instructed on the use of medications, either prescriptions or OTC and informed on when the appropriate follow up time period should be. In addition, patient was also instructed that should any acute worsening occur that they should notify this clinic asap or call 911.            Review of Systems   Constitutional: Negative.  Negative for chills and fever.   HENT: Negative.  Negative for hearing loss.    Eyes: Negative.   "Negative for blurred vision and double vision.   Respiratory: Negative.  Negative for cough and hemoptysis.    Cardiovascular: Negative.  Negative for chest pain and palpitations.   Gastrointestinal: Negative.  Negative for heartburn and nausea.   Genitourinary: Negative.  Negative for dysuria.   Musculoskeletal: Negative.  Negative for myalgias.   Skin: Negative.  Negative for rash.   Neurological: Negative.  Negative for dizziness, tingling and headaches.   Endo/Heme/Allergies: Negative.  Does not bruise/bleed easily.   Psychiatric/Behavioral: Negative.  Negative for depression and suicidal ideas.   All other systems reviewed and are negative.             Objective     /82   Pulse 60   Temp 36.3 °C (97.4 °F) (Temporal)   Resp 16   Ht 1.753 m (5' 9\")   Wt 84.6 kg (186 lb 6.4 oz)   SpO2 96%   BMI 27.53 kg/m²      Physical Exam  Vitals and nursing note reviewed.   Constitutional:       General: He is not in acute distress.     Appearance: He is well-developed. He is not diaphoretic.   HENT:      Head: Normocephalic and atraumatic.      Mouth/Throat:      Pharynx: No oropharyngeal exudate.   Eyes:      Pupils: Pupils are equal, round, and reactive to light.   Cardiovascular:      Rate and Rhythm: Normal rate and regular rhythm.      Heart sounds: Normal heart sounds. No murmur heard.    No friction rub. No gallop.   Pulmonary:      Effort: Pulmonary effort is normal. No respiratory distress.      Breath sounds: Normal breath sounds. No wheezing or rales.   Chest:      Chest wall: No tenderness.   Neurological:      Mental Status: He is alert and oriented to person, place, and time.   Psychiatric:         Behavior: Behavior normal.         Thought Content: Thought content normal.         Judgment: Judgment normal.                             Assessment & Plan        1. Inflammatory polyarthritis (HCC)    - Comp Metabolic Panel; Future  - Lipid Profile; Future    2. Stage 3a chronic kidney disease " (HCC)    - Comp Metabolic Panel; Future  - Lipid Profile; Future    3. Abnormal finding of blood chemistry, unspecified   - Lipid Profile; Future

## 2022-07-05 ENCOUNTER — TELEPHONE (OUTPATIENT)
Dept: HEALTH INFORMATION MANAGEMENT | Facility: OTHER | Age: 72
End: 2022-07-05
Payer: MEDICARE

## 2023-06-13 ENCOUNTER — TELEPHONE (OUTPATIENT)
Dept: HEALTH INFORMATION MANAGEMENT | Facility: OTHER | Age: 73
End: 2023-06-13
Payer: MEDICARE

## 2023-06-13 NOTE — TELEPHONE ENCOUNTER
Returned call as requested and mbr stated he has cologuard kit at home and will get it done and return when completed.

## 2023-06-15 ENCOUNTER — HOSPITAL ENCOUNTER (OUTPATIENT)
Facility: MEDICAL CENTER | Age: 73
End: 2023-06-15
Payer: MEDICARE

## 2023-06-15 PROCEDURE — 82274 ASSAY TEST FOR BLOOD FECAL: CPT

## 2023-06-22 LAB — IMM ASSAY OCC BLD FITOB: NEGATIVE

## 2023-07-20 ENCOUNTER — TELEPHONE (OUTPATIENT)
Dept: HEALTH INFORMATION MANAGEMENT | Facility: OTHER | Age: 73
End: 2023-07-20
Payer: MEDICARE

## 2024-05-15 ENCOUNTER — OFFICE VISIT (OUTPATIENT)
Dept: MEDICAL GROUP | Facility: PHYSICIAN GROUP | Age: 74
End: 2024-05-15
Payer: MEDICARE

## 2024-05-15 VITALS
SYSTOLIC BLOOD PRESSURE: 122 MMHG | DIASTOLIC BLOOD PRESSURE: 88 MMHG | HEART RATE: 60 BPM | WEIGHT: 179 LBS | RESPIRATION RATE: 12 BRPM | HEIGHT: 69 IN | TEMPERATURE: 97.7 F | OXYGEN SATURATION: 96 % | BODY MASS INDEX: 26.51 KG/M2

## 2024-05-15 DIAGNOSIS — R52 BODY ACHES: ICD-10-CM

## 2024-05-15 LAB
FLUAV RNA SPEC QL NAA+PROBE: NEGATIVE
FLUBV RNA SPEC QL NAA+PROBE: NEGATIVE
RSV RNA SPEC QL NAA+PROBE: NEGATIVE
SARS-COV-2 RNA RESP QL NAA+PROBE: NEGATIVE

## 2024-05-15 PROCEDURE — 3074F SYST BP LT 130 MM HG: CPT | Performed by: PHYSICIAN ASSISTANT

## 2024-05-15 PROCEDURE — 99213 OFFICE O/P EST LOW 20 MIN: CPT | Performed by: PHYSICIAN ASSISTANT

## 2024-05-15 PROCEDURE — 0241U POCT CEPHEID COV-2, FLU A/B, RSV - PCR: CPT | Performed by: PHYSICIAN ASSISTANT

## 2024-05-15 PROCEDURE — 3079F DIAST BP 80-89 MM HG: CPT | Performed by: PHYSICIAN ASSISTANT

## 2024-05-15 ASSESSMENT — ENCOUNTER SYMPTOMS
HEADACHES: 0
COUGH: 0
SORE THROAT: 0
CHILLS: 0
DIZZINESS: 0
SHORTNESS OF BREATH: 0
MYALGIAS: 1
FEVER: 0

## 2024-05-15 NOTE — PROGRESS NOTES
"CC:  Chief Complaint   Patient presents with    Influenza       HISTORY OF PRESENT ILLNESS: Patient is a 73 y.o. male established patient presenting with issues below  Pt having chills that started 2 days ago. Has brain fog. No fever, cough, congestion, stomach pain, dizziness.     Current Outpatient Medications   Medication Sig Dispense Refill    Multiple Vitamin (MULTIVITAMIN ADULT PO) Take  by mouth.      omeprazole (PRILOSEC) 20 MG delayed-release capsule Take 20 mg by mouth every day.      VITAMIN E PO Take 1 Cap by mouth every day.       No current facility-administered medications for this visit.        ROS:     Review of Systems   Constitutional:  Negative for chills, fever and malaise/fatigue.   HENT:  Negative for congestion and sore throat.    Respiratory:  Negative for cough and shortness of breath.    Cardiovascular:  Negative for chest pain.   Musculoskeletal:  Positive for myalgias.   Neurological:  Negative for dizziness and headaches.       Exam:    /88   Pulse 60   Temp 36.5 °C (97.7 °F) (Temporal)   Resp 12   Ht 1.753 m (5' 9\")   Wt 81.2 kg (179 lb)   SpO2 96%  Body mass index is 26.43 kg/m².    General:  Well nourished, well developed male in NAD  Head is grossly normal.  Neck: Supple.   Pulmonary: Clear to auscultation. No ronchi, wheezing or rales  Cardiac: Regular rate and rhythm. No murmurs.  Skin: Warm and dry. No obvious lesions  Neuro: Normal muscle tone. Gait normal. Alert and oriented.  Psych: Normal mood and affect      Please note that this dictation was created using voice recognition software. I have made every reasonable attempt to correct obvious errors, but I expect that there are errors of grammar and possibly content that I did not discover before finalizing the note.        Assessment/Plan:  1. Body aches  Note for work written. Negative POCT tests.   - POCT Cepheid CoV-2, Flu A/B, RSV - PCR             "

## 2024-05-15 NOTE — LETTER
May 15, 2024         Patient: Marquez Crawford   YOB: 1950   Date of Visit: 5/15/2024           To Whom it May Concern:    Marquez Crawford was seen in my clinic on 5/15/2024. Please excuse him from work until 5/20/2024.    If you have any questions or concerns, please don't hesitate to call.        Sincerely,           Hilario Cho P.A.-C.  Electronically Signed

## 2024-06-07 ENCOUNTER — OFFICE VISIT (OUTPATIENT)
Dept: MEDICAL GROUP | Facility: PHYSICIAN GROUP | Age: 74
End: 2024-06-07
Payer: MEDICARE

## 2024-06-07 VITALS
TEMPERATURE: 97.8 F | OXYGEN SATURATION: 96 % | HEART RATE: 74 BPM | HEIGHT: 69 IN | WEIGHT: 176.59 LBS | BODY MASS INDEX: 26.16 KG/M2 | DIASTOLIC BLOOD PRESSURE: 76 MMHG | RESPIRATION RATE: 14 BRPM | SYSTOLIC BLOOD PRESSURE: 138 MMHG

## 2024-06-07 DIAGNOSIS — T73.3XXA FATIGUE DUE TO EXCESSIVE EXERTION, INITIAL ENCOUNTER: ICD-10-CM

## 2024-06-07 PROCEDURE — 99212 OFFICE O/P EST SF 10 MIN: CPT | Performed by: STUDENT IN AN ORGANIZED HEALTH CARE EDUCATION/TRAINING PROGRAM

## 2024-06-07 PROCEDURE — 3078F DIAST BP <80 MM HG: CPT | Performed by: STUDENT IN AN ORGANIZED HEALTH CARE EDUCATION/TRAINING PROGRAM

## 2024-06-07 PROCEDURE — 3075F SYST BP GE 130 - 139MM HG: CPT | Performed by: STUDENT IN AN ORGANIZED HEALTH CARE EDUCATION/TRAINING PROGRAM

## 2024-06-07 ASSESSMENT — PATIENT HEALTH QUESTIONNAIRE - PHQ9: CLINICAL INTERPRETATION OF PHQ2 SCORE: 0

## 2024-06-07 NOTE — ASSESSMENT & PLAN NOTE
Fatigue secondary to working very long hours.  I advised patient that I will not be able to write him a note medically excusing him from mandatory overtime at his job.

## 2024-06-07 NOTE — PROGRESS NOTES
HISTORY OF PRESENT ILLNESS: Marquez is a pleasant 73 y.o. male, established patient who presents today to discuss medical problems as listed below:    Marquez is here to discuss an issue at work.  They are making him do mandatory overtime causing him to be tired and fatigued.  He would like a medical note advising that he is unable to do this.  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    Current Outpatient Medications Ordered in Epic   Medication Sig Dispense Refill    Multiple Vitamin (MULTIVITAMIN ADULT PO) Take  by mouth.      omeprazole (PRILOSEC) 20 MG delayed-release capsule Take 20 mg by mouth every day.      VITAMIN E PO Take 1 Cap by mouth every day.       No current [facility]-ordered facility-administered medications on file.       Review of systems:  Per [facility]    Patient Active Problem List    Diagnosis Date Noted    Fatigue due to excessive exertion 06/07/2024    Allergy to statin medication 03/17/2020    Recurrent sinusitis 03/20/2018    GERD (gastroesophageal reflux disease) 03/20/2018    Hyperlipidemia LDL goal <100 03/20/2018    Patient's noncompliance with other medical treatment and regimen 08/30/2017     Past Surgical History:   Procedure Laterality Date    EDILBERTO BY LAPAROSCOPY N/A 2/24/2020    Procedure: CHOLECYSTECTOMY, LAPAROSCOPIC;  Surgeon: Catherine Morocho M.D.;  Location: SURGERY [facility];  Service: General     Social History     Tobacco Use    Smoking status: Never    Smokeless tobacco: Never   Vaping Use    Vaping status: Never Used   Substance Use Topics    Alcohol use: Not Currently    Drug use: Yes     Types: Marijuana     Comment: occ marijuana      History reviewed. No pertinent family history.  Current Outpatient Medications   Medication Sig Dispense Refill    Multiple Vitamin (MULTIVITAMIN ADULT PO) Take  by mouth.      omeprazole (PRILOSEC) 20 MG delayed-release capsule Take 20 mg by mouth every day.       "VITAMIN E PO Take 1 Cap by mouth every day.       No current facility-administered medications for this visit.       Allergies:  Allergies   Allergen Reactions    Hmg-Coa-R Inhibitors Myalgia     Blister and the severe joint problem  Blister and the severe joint problem    Lipitor [Atorvastatin] Myalgia       Allergies, past medical history, past surgical history, family history, social history reviewed and updated.    Objective:    /76   Pulse 74   Temp 36.6 °C (97.8 °F) (Temporal)   Resp 14   Ht 1.753 m (5' 9\")   Wt 80.1 kg (176 lb 9.4 oz)   SpO2 96%   BMI 26.08 kg/m²    Body mass index is 26.08 kg/m².    Physical exam:  General: Normal appearance, no acute distress, not ill-appearing    Assessment/Plan:    Problem List Items Addressed This Visit       Fatigue due to excessive exertion     Fatigue secondary to working very long hours.  I advised patient that I will not be able to write him a note medically excusing him from mandatory overtime at his job.             Return if symptoms worsen or fail to improve.   "

## 2024-06-18 ENCOUNTER — OFFICE VISIT (OUTPATIENT)
Dept: MEDICAL GROUP | Facility: PHYSICIAN GROUP | Age: 74
End: 2024-06-18
Payer: MEDICARE

## 2024-06-18 VITALS
BODY MASS INDEX: 26.32 KG/M2 | WEIGHT: 177.69 LBS | HEIGHT: 69 IN | TEMPERATURE: 98.2 F | SYSTOLIC BLOOD PRESSURE: 162 MMHG | HEART RATE: 63 BPM | OXYGEN SATURATION: 96 % | DIASTOLIC BLOOD PRESSURE: 86 MMHG | RESPIRATION RATE: 14 BRPM

## 2024-06-18 DIAGNOSIS — R03.0 ELEVATED BLOOD PRESSURE READING: ICD-10-CM

## 2024-06-18 DIAGNOSIS — J06.9 VIRAL UPPER RESPIRATORY TRACT INFECTION: ICD-10-CM

## 2024-06-18 PROCEDURE — 3079F DIAST BP 80-89 MM HG: CPT | Performed by: STUDENT IN AN ORGANIZED HEALTH CARE EDUCATION/TRAINING PROGRAM

## 2024-06-18 PROCEDURE — 99213 OFFICE O/P EST LOW 20 MIN: CPT | Performed by: STUDENT IN AN ORGANIZED HEALTH CARE EDUCATION/TRAINING PROGRAM

## 2024-06-18 PROCEDURE — 3077F SYST BP >= 140 MM HG: CPT | Performed by: STUDENT IN AN ORGANIZED HEALTH CARE EDUCATION/TRAINING PROGRAM

## 2024-06-18 NOTE — LETTER
June 18, 2024    To Whom It May Concern:         This is confirmation that Marquez Crawford attended his scheduled appointment with Aline Barnett D.O. on 6/18/24.    Marquez Crawford was recently ill. He is clear to return to work on 6/20/2024.          If you have any questions please do not hesitate to call me at the phone number listed below.    Sincerely,    Aline Barnett D.O.  [phone]

## 2024-06-19 PROBLEM — J06.9 VIRAL UPPER RESPIRATORY TRACT INFECTION: Status: ACTIVE | Noted: 2024-06-19

## 2024-06-19 PROBLEM — R03.0 ELEVATED BLOOD PRESSURE READING: Status: ACTIVE | Noted: 2024-06-19

## 2024-06-19 NOTE — PROGRESS NOTES
HISTORY OF PRESENT ILLNESS: Marquez is a pleasant 73 y.o. male, established patient who presents today to discuss medical problems as listed below:    An upper respiratory infection the last week needs a letter for work.  His symptoms are significantly improved nearly resolved.  Had cough and congestion, rhinorrhea.    Current Outpatient Medications Ordered in Epic   Medication Sig Dispense Refill    Multiple Vitamin (MULTIVITAMIN ADULT PO) Take  by mouth.      omeprazole (PRILOSEC) 20 MG delayed-release capsule Take 20 mg by mouth every day.      VITAMIN E PO Take 1 Cap by mouth every day.       No current [facility]-ordered facility-administered medications on file.       Review of systems:  Per [facility]    Patient Active Problem List    Diagnosis Date Noted    Viral upper respiratory tract infection 06/19/2024    Elevated blood pressure reading 06/19/2024    Fatigue due to excessive exertion 06/07/2024    Allergy to statin medication 03/17/2020    Recurrent sinusitis 03/20/2018    GERD (gastroesophageal reflux disease) 03/20/2018    Hyperlipidemia LDL goal <100 03/20/2018    Patient's noncompliance with other medical treatment and regimen 08/30/2017     Past Surgical History:   Procedure Laterality Date    EDILBERTO BY LAPAROSCOPY N/A 2/24/2020    Procedure: CHOLECYSTECTOMY, LAPAROSCOPIC;  Surgeon: Catherine Morocho M.D.;  Location: SURGERY [facility];  Service: General     Social History     Tobacco Use    Smoking status: Never    Smokeless tobacco: Never   Vaping Use    Vaping status: Never Used   Substance Use Topics    Alcohol use: Not Currently    Drug use: Yes     Types: Marijuana     Comment: occ marijuana      History reviewed. No pertinent family history.  Current Outpatient Medications   Medication Sig Dispense Refill    Multiple Vitamin (MULTIVITAMIN ADULT PO) Take  by mouth.      omeprazole (PRILOSEC) 20 MG delayed-release capsule Take 20 mg by mouth every day.      VITAMIN E PO Take 1 Cap by mouth every day.  "      No current facility-administered medications for this visit.       Allergies:  Allergies   Allergen Reactions    Hmg-Coa-R Inhibitors Myalgia     Blister and the severe joint problem  Blister and the severe joint problem    Lipitor [Atorvastatin] Myalgia       Allergies, past medical history, past surgical history, family history, social history reviewed and updated.    Objective:    BP (!) 162/86   Pulse 63   Temp 36.8 °C (98.2 °F) (Temporal)   Resp 14   Ht 1.753 m (5' 9\")   Wt 80.6 kg (177 lb 11.1 oz)   SpO2 96%   BMI 26.24 kg/m²    Body mass index is 26.24 kg/m².    Physical exam:  General: Normal appearance, no acute distress, not ill-appearing  HEENT: EOM intact, conjunctiva normal limits, negative right/left eye discharge.  Sclera anicteric  Cardiovascular: Normal rate and rhythm, no murmurs  Pulmonary: No respiratory distress, no wheezing, no rales, breath sounds normal.  Musculoskeletal: No edema bilaterally  Skin: Warm, dry, no lesions  Neurological: No focal deficits, normal gait  Psychiatric: Mood within normal limits    Assessment/Plan:    Problem List Items Addressed This Visit       Viral upper respiratory tract infection     Clear to return to work, letter written.         Elevated blood pressure reading      elevated blood pressure reading today 160/86.  Repeat was around the same.  He has no history of hypertension.  Reviewed multiple readings in the past which were normal.  Advised him to check his blood pressures at home his wife has a machine and he is to return to care if elevated.             Return in about 6 months (around 12/18/2024), or if symptoms worsen or fail to improve.       "

## 2024-11-14 ENCOUNTER — TELEPHONE (OUTPATIENT)
Dept: HEALTH INFORMATION MANAGEMENT | Facility: OTHER | Age: 74
End: 2024-11-14

## 2024-12-31 ENCOUNTER — OFFICE VISIT (OUTPATIENT)
Dept: MEDICAL GROUP | Facility: PHYSICIAN GROUP | Age: 74
End: 2024-12-31
Payer: MEDICARE

## 2024-12-31 VITALS
WEIGHT: 191.36 LBS | DIASTOLIC BLOOD PRESSURE: 90 MMHG | SYSTOLIC BLOOD PRESSURE: 142 MMHG | TEMPERATURE: 97.4 F | HEART RATE: 67 BPM | BODY MASS INDEX: 28.34 KG/M2 | HEIGHT: 69 IN | RESPIRATION RATE: 16 BRPM | OXYGEN SATURATION: 94 %

## 2024-12-31 DIAGNOSIS — N52.01 ERECTILE DYSFUNCTION DUE TO ARTERIAL INSUFFICIENCY: ICD-10-CM

## 2024-12-31 RX ORDER — TADALAFIL 10 MG/1
10 TABLET ORAL
Qty: 20 TABLET | Refills: 3 | Status: SHIPPED | OUTPATIENT
Start: 2024-12-31

## 2024-12-31 NOTE — PROGRESS NOTES
Verbal Consent given for [employer] recording software    HISTORY OF PRESENT ILLNESS: Marquez is a pleasant 74 y.o. male, established patient who presents today to discuss medical problems as listed below:    History of Present Illness  The patient is a 74-year-old male presenting with erectile dysfunction for 6-8 months. He has difficulty maintaining an erection but no issues achieving one. Libido is unaffected. He has never used medication for this issue. He leads an active lifestyle with regular weightlifting and high energy levels. Interested in trying Cialis.       Current Outpatient Medications Ordered in Epic   Medication Sig Dispense Refill    tadalafil (CIALIS) 10 MG tablet Take 1 Tablet by mouth 1 time a day as needed for Erectile Dysfunction. 30 min prior to activity. 20 Tablet 3    Multiple Vitamin (MULTIVITAMIN ADULT PO) Take  by mouth.      omeprazole (PRILOSEC) 20 MG delayed-release capsule Take 20 mg by mouth every day.      VITAMIN E PO Take 1 Cap by mouth every day.       No current [facility]-ordered facility-administered medications on file.       Review of systems:  Per HPI    Patient Active Problem List    Diagnosis Date Noted    Viral upper respiratory tract infection 06/19/2024    Elevated blood pressure reading 06/19/2024    Fatigue due to excessive exertion 06/07/2024    Allergy to statin medication 03/17/2020    Recurrent sinusitis 03/20/2018    GERD (gastroesophageal reflux disease) 03/20/2018    Hyperlipidemia LDL goal <100 03/20/2018    Patient's noncompliance with other medical treatment and regimen 08/30/2017     Past Surgical History:   Procedure Laterality Date    EDILBERTO BY LAPAROSCOPY N/A 2/24/2020    Procedure: CHOLECYSTECTOMY, LAPAROSCOPIC;  Surgeon: Catherine Morocho M.D.;  Location: SURGERY [facility];  Service: General     Social History     Tobacco Use    Smoking status: Never    Smokeless tobacco: Never   Vaping Use    Vaping status: Never Used   Substance Use Topics    Alcohol use:  "Not Currently    Drug use: Yes     Types: Marijuana     Comment: occ marijuana      No family history on file.  Current Outpatient Medications   Medication Sig Dispense Refill    tadalafil (CIALIS) 10 MG tablet Take 1 Tablet by mouth 1 time a day as needed for Erectile Dysfunction. 30 min prior to activity. 20 Tablet 3    Multiple Vitamin (MULTIVITAMIN ADULT PO) Take  by mouth.      omeprazole (PRILOSEC) 20 MG delayed-release capsule Take 20 mg by mouth every day.      VITAMIN E PO Take 1 Cap by mouth every day.       No current facility-administered medications for this visit.       Allergies:  Allergies   Allergen Reactions    Hmg-Coa-R Inhibitors Myalgia     Blister and the severe joint problem  Blister and the severe joint problem    Lipitor [Atorvastatin] Myalgia       Allergies, past medical history, past surgical history, family history, social history reviewed and updated.    Objective:    BP (!) 142/90   Pulse 67   Temp 36.3 °C (97.4 °F) (Temporal)   Resp 16   Ht 1.753 m (5' 9\")   Wt 86.8 kg (191 lb 5.8 oz)   SpO2 94%   BMI 28.26 kg/m²    Body mass index is 28.26 kg/m².    Physical exam:  General: Normal appearance, no acute distress, not ill-appearing  HEENT: EOM intact, conjunctiva normal limits, negative right/left eye discharge.  Sclera anicteric  Cardiovascular: Normal rate and rhythm, no murmurs  Pulmonary: No respiratory distress, no wheezing, no rales, breath sounds normal.  Musculoskeletal: No edema bilaterally  Skin: Warm, dry, no lesions  Neurological: No focal deficits, normal gait  Psychiatric: Mood within normal limits    Assessment/Plan:    Assessment & Plan  1. Erectile dysfunction   - Prescribed Cialis, 20 tablets with 3 refills, to be taken as needed.  - Patient consents to the medication regimen.  - Advised to return if no improvement.       Problem List Items Addressed This Visit    None  Visit Diagnoses       Erectile dysfunction due to arterial insufficiency        Relevant " Medications    tadalafil (CIALIS) 10 MG tablet            Return if symptoms worsen or fail to improve.

## 2025-03-04 DIAGNOSIS — Z12.83 SKIN CANCER SCREENING: ICD-10-CM

## 2025-05-09 ENCOUNTER — OFFICE VISIT (OUTPATIENT)
Dept: URGENT CARE | Facility: CLINIC | Age: 75
End: 2025-05-09
Payer: MEDICARE

## 2025-05-09 VITALS
SYSTOLIC BLOOD PRESSURE: 142 MMHG | OXYGEN SATURATION: 96 % | DIASTOLIC BLOOD PRESSURE: 94 MMHG | TEMPERATURE: 98.5 F | RESPIRATION RATE: 14 BRPM | HEART RATE: 64 BPM | BODY MASS INDEX: 28.29 KG/M2 | HEIGHT: 69 IN | WEIGHT: 191 LBS

## 2025-05-09 DIAGNOSIS — R05.1 ACUTE COUGH: ICD-10-CM

## 2025-05-09 PROCEDURE — 0241U POCT CEPHEID COV-2, FLU A/B, RSV - PCR: CPT | Performed by: STUDENT IN AN ORGANIZED HEALTH CARE EDUCATION/TRAINING PROGRAM

## 2025-05-09 PROCEDURE — 99213 OFFICE O/P EST LOW 20 MIN: CPT | Performed by: STUDENT IN AN ORGANIZED HEALTH CARE EDUCATION/TRAINING PROGRAM

## 2025-05-09 PROCEDURE — 3080F DIAST BP >= 90 MM HG: CPT | Performed by: STUDENT IN AN ORGANIZED HEALTH CARE EDUCATION/TRAINING PROGRAM

## 2025-05-09 PROCEDURE — 3077F SYST BP >= 140 MM HG: CPT | Performed by: STUDENT IN AN ORGANIZED HEALTH CARE EDUCATION/TRAINING PROGRAM

## 2025-05-09 RX ORDER — KETOCONAZOLE 20 MG/G
CREAM TOPICAL
COMMUNITY
Start: 2025-03-10

## 2025-05-09 ASSESSMENT — ENCOUNTER SYMPTOMS
CHILLS: 0
FEVER: 0
COUGH: 1

## 2025-05-09 NOTE — PROGRESS NOTES
Marquez Crawford is a 74 y.o. male here for a non-provider visit for PPD placement -- Step 2 of 2    Reason for PPD:  work requirement    1. TB evaluation questionnaire completed by patient? Yes      -  If any answers marked yes did you contact a provider prior to placing? Not Indicated  2.  Patient notified to return to clinic for reading on: SUNDAY 5/11/25 AFTER 11:13 AM OR MONDAY 5/12/25 BEFORE 11:13 AM   3.  PPD Placement documentation completed on TB evaluation questionnaire? Yes  4.  Location of TB evaluation questionnaire filed: [facility], PPD PLACED [facility].

## 2025-05-09 NOTE — LETTER
May 9, 2025    To Whom It May Concern:         This is confirmation that Marquez COHEN Yvette attended his scheduled appointment with Prabhakar Leone M.D. on 5/09/25. He has been ill this week. Please excuse from time missed. Likely Return to work on 5/13 if feeling improved.          If you have any questions please do not hesitate to call me at the phone number listed below.    Sincerely,          Prabhakar Leone M.D.  [phone]

## 2025-05-09 NOTE — PROGRESS NOTES
"Subjective:   Marquez Crawford is a 74 y.o. male who presents for Cough (Congestion, sore throat x 3 days )      HPI:  74-year-old male presents with cough congestion sore throat the past few days.  He reports the symptoms started on Wednesday morning.  - He reports mild cough, no shortness of breath, he reports some sinus congestion and postnasal drip lots of mucus production.  Cough is minimally productive.  - Cough is worse at night  -Feeling fatigue and under the weather.  Needs a note for work.  Review of Systems   Constitutional:  Negative for chills and fever.   HENT:  Positive for congestion.    Respiratory:  Positive for cough.        Medications:    ketoconazole Crea  MULTIVITAMIN ADULT PO  omeprazole  tadalafil  VITAMIN E PO    Allergies: Hmg-coa-r inhibitors and Lipitor [atorvastatin]    Problem List: Marquez Crawford does not have any pertinent problems on file.    Surgical History:  Past Surgical History:   Procedure Laterality Date    EDILBERTO BY LAPAROSCOPY N/A 2/24/2020    Procedure: CHOLECYSTECTOMY, LAPAROSCOPIC;  Surgeon: Catherine Morocho M.D.;  Location: SURGERY [facility];  Service: General       Past Social Hx: Marquez Crawford  reports that he has never smoked. He has never used smokeless tobacco. He reports that he does not currently use alcohol. He reports current drug use. Drug: Marijuana.     Past Family Hx:  Marquez Crawford family history is not on file.     Problem list, medications, and allergies reviewed by myself today in Epic.     Objective:     BP (!) 142/94 (BP Location: Right arm, Patient Position: Sitting, BP Cuff Size: Adult long)   Pulse 64   Temp 36.9 °C (98.5 °F) (Temporal)   Resp 14   Ht 1.753 m (5' 9\")   Wt 86.6 kg (191 lb)   SpO2 96%   BMI 28.21 kg/m²     Physical Exam  Constitutional:       Appearance: Normal appearance.   HENT:      Head: Normocephalic and atraumatic.      Right Ear: Tympanic membrane normal.      Left Ear: Tympanic membrane normal.    "   Nose: Nose normal. No congestion or rhinorrhea.      Mouth/Throat:      Pharynx: No oropharyngeal exudate or posterior oropharyngeal erythema.   Eyes:      Extraocular Movements: Extraocular movements intact.      Conjunctiva/sclera: Conjunctivae normal.   Cardiovascular:      Rate and Rhythm: Normal rate and regular rhythm.      Pulses: Normal pulses.      Heart sounds: Normal heart sounds.   Pulmonary:      Effort: Pulmonary effort is normal.      Breath sounds: Normal breath sounds.   Neurological:      Mental Status: He is alert.         Assessment/Plan:     Diagnosis and associated orders:     1. Acute cough  POCT CoV-2, Flu A/B, RSV by PCR         Comments/MDM:     1. Acute cough  Plan:   Likely viral upper respiratory infection (common cold, viral pharyngitis).  Symptoms include [nasal congestion, sore throat, cough, mild fever, headache, fatigue]. No evidence of bacterial infection (e.g., no severe sore throat, high fever, or tonsillar exudates).    Symptomatic Management     - Pain relief: Recommend acetaminophen or ibuprofen for fever and aches.      - Nasal congestion:      - Nasal saline irrigation or nasal spray (e.g., saline spray) as needed.     - If congestion is significant, consider pseudoephedrine 30 mg every 4-6 hours or nasal steroid spray such as Flonase     -  Cough relief: Continue cough suppressant (e.g., dextromethorphan) and/or an expectorant (e.g., guaifenesin) if productive.     -  Hydration: Advise increased fluid intake to help thin secretions and prevent dehydration.     - Throat comfort: Suggest throat lozenges, warm saltwater gargles, or warm teas with honey for soothing the throat.    Infection Control:     - Viral URIs are highly contagious, especially in the first few days. Recommend avoiding close contact with others, including staying home from work or school until fever resolves and they feel better (typically after 3-5 days).    Follow-up:     - Symptom duration: Viral URIs  typically resolve within 7-10 days. If symptoms worsen or persist longer than 10-14 days, please return for additional evaluation     - Follow-up sooner if you develop new or worsening symptoms such as severe sore throat, high fever, shortness of breath, or worsening cough.    - POCT CoV-2, Flu A/B, RSV by PCR           Differential diagnosis, natural history, supportive care, and indications for immediate follow-up discussed.    Advised the patient to follow-up with the primary care physician for recheck, reevaluation, and consideration of further management.    Please note that this dictation was created using voice recognition software. I have made a reasonable attempt to correct obvious errors, but I expect that there are errors of grammar and possibly content that I did not discover before finalizing the note.    Prabhakar Leone M.D.

## 2025-08-08 ENCOUNTER — OFFICE VISIT (OUTPATIENT)
Dept: MEDICAL GROUP | Facility: PHYSICIAN GROUP | Age: 75
End: 2025-08-08
Payer: MEDICARE

## 2025-08-08 VITALS
DIASTOLIC BLOOD PRESSURE: 76 MMHG | SYSTOLIC BLOOD PRESSURE: 142 MMHG | TEMPERATURE: 97.9 F | HEIGHT: 69 IN | HEART RATE: 62 BPM | OXYGEN SATURATION: 98 % | BODY MASS INDEX: 27.98 KG/M2 | WEIGHT: 188.93 LBS

## 2025-08-08 DIAGNOSIS — N52.01 ERECTILE DYSFUNCTION DUE TO ARTERIAL INSUFFICIENCY: ICD-10-CM

## 2025-08-08 PROBLEM — Z91.199 PATIENT'S NONCOMPLIANCE WITH OTHER MEDICAL TREATMENT AND REGIMEN: Status: RESOLVED | Noted: 2017-08-30 | Resolved: 2025-08-08

## 2025-08-08 PROBLEM — T73.3XXA FATIGUE DUE TO EXCESSIVE EXERTION: Status: RESOLVED | Noted: 2024-06-07 | Resolved: 2025-08-08

## 2025-08-08 PROBLEM — J06.9 VIRAL UPPER RESPIRATORY TRACT INFECTION: Status: RESOLVED | Noted: 2024-06-19 | Resolved: 2025-08-08

## 2025-08-08 PROBLEM — J32.9 RECURRENT SINUSITIS: Status: RESOLVED | Noted: 2018-03-20 | Resolved: 2025-08-08

## 2025-08-08 PROCEDURE — 99213 OFFICE O/P EST LOW 20 MIN: CPT | Performed by: STUDENT IN AN ORGANIZED HEALTH CARE EDUCATION/TRAINING PROGRAM

## 2025-08-08 PROCEDURE — 3077F SYST BP >= 140 MM HG: CPT | Performed by: STUDENT IN AN ORGANIZED HEALTH CARE EDUCATION/TRAINING PROGRAM

## 2025-08-08 PROCEDURE — 3078F DIAST BP <80 MM HG: CPT | Performed by: STUDENT IN AN ORGANIZED HEALTH CARE EDUCATION/TRAINING PROGRAM

## 2025-08-08 RX ORDER — TADALAFIL 20 MG/1
20 TABLET ORAL PRN
Qty: 20 TABLET | Refills: 3 | Status: SHIPPED | OUTPATIENT
Start: 2025-08-08

## 2025-08-08 ASSESSMENT — PATIENT HEALTH QUESTIONNAIRE - PHQ9: CLINICAL INTERPRETATION OF PHQ2 SCORE: 0

## (undated) DEVICE — Device

## (undated) DEVICE — HUMID-VENT HEAT AND MOISTURE EXCHANGE- (50/BX)

## (undated) DEVICE — KIT ROOM DECONTAMINATION

## (undated) DEVICE — APPLIER 5MM MED/LARGE CLIP - (3/BX)

## (undated) DEVICE — GLOVE, LITE (PAIR)

## (undated) DEVICE — PROTECTOR ULNA NERVE - (36PR/CA)

## (undated) DEVICE — GLOVE BIOGEL PI INDICATOR SZ 6.5 SURGICAL PF LF - (50/BX 4BX/CA)

## (undated) DEVICE — BAG, SPONGE COUNT 50600

## (undated) DEVICE — SODIUM CHL IRRIGATION 0.9% 1000ML (12EA/CA)

## (undated) DEVICE — TROCAR 5X100 BLADED Z-THREAD - KII (6/BX)

## (undated) DEVICE — GOWN WARMING STANDARD FLEX - (30/CA)

## (undated) DEVICE — ELECTRODE 5MM LHK LAPSCP STERILE DISP- MEGADYNE  (5/CA)

## (undated) DEVICE — SPONGE GAUZESTER. 2X2 4-PL - (2/PK 50PK/BX 30BX/CS)

## (undated) DEVICE — NEPTUNE 4 PORT MANIFOLD - (20/PK)

## (undated) DEVICE — SUCTION INSTRUMENT YANKAUER BULBOUS TIP W/O VENT (50EA/CA)

## (undated) DEVICE — GLOVE BIOGEL PI ULTRATOUCH SZ 7.0 SURGICAL PF LF- POWDER FREE (50/BX 4BX/CA)

## (undated) DEVICE — CANISTER SUCTION RIGID RED 1500CC (40EA/CA)

## (undated) DEVICE — DRESSING TRANSPARENT FILM TEGADERM 2.375 X 2.75"  (100EA/BX)"

## (undated) DEVICE — GLOVE BIOGEL INDICATOR SZ 6.5 SURGICAL PF LTX - (50PR/BX 4BX/CA)

## (undated) DEVICE — SET SUCTION/IRRIGATION WITH DISPOSABLE TIP (6/CA )PART #0250-070-520 IS A SUB

## (undated) DEVICE — ELECTRODE DUAL RETURN W/ CORD - (50/PK)

## (undated) DEVICE — CHLORAPREP 26 ML APPLICATOR - ORANGE TINT(25/CA)

## (undated) DEVICE — LACTATED RINGERS INJ 1000 ML - (14EA/CA 60CA/PF)

## (undated) DEVICE — CANNULA W/SEAL 5X100 Z-THRE - ADED KII (12/BX)

## (undated) DEVICE — KIT ANESTHESIA W/CIRCUIT & 3/LT BAG W/FILTER (20EA/CA)

## (undated) DEVICE — TROCAR Z THREAD 12 X 100 - BLADED (6/BX)

## (undated) DEVICE — SENSOR SPO2 NEO LNCS ADHESIVE (20/BX) SEE USER NOTES

## (undated) DEVICE — GLOVE BIOGEL PI INDICATOR SZ 7.5 SURGICAL PF LF -(50/BX 4BX/CA)

## (undated) DEVICE — TUBING INSUFFLATION - (10/BX)

## (undated) DEVICE — HEAD HOLDER JUNIOR/ADULT

## (undated) DEVICE — SUTURE GENERAL

## (undated) DEVICE — GLOVE BIOGEL SZ 6.5 SURGICAL PF LTX (50PR/BX 4BX/CA)

## (undated) DEVICE — WATER IRRIGATION STERILE 1000ML (12EA/CA)

## (undated) DEVICE — TUBE CONNECTING SUCTION - CLEAR PLASTIC STERILE 72 IN (50EA/CA)

## (undated) DEVICE — ELECTRODE 850 FOAM ADHESIVE - HYDROGEL RADIOTRNSPRNT (50/PK)

## (undated) DEVICE — SUTURE 4-0 VICRYL PLUS FS-2 - 27 INCH (36/BX)

## (undated) DEVICE — MASK ANESTHESIA ADULT  - (100/CA)